# Patient Record
Sex: FEMALE | Race: WHITE | NOT HISPANIC OR LATINO | Employment: OTHER | URBAN - METROPOLITAN AREA
[De-identification: names, ages, dates, MRNs, and addresses within clinical notes are randomized per-mention and may not be internally consistent; named-entity substitution may affect disease eponyms.]

---

## 2017-03-27 ENCOUNTER — APPOINTMENT (OUTPATIENT)
Dept: LAB | Facility: HOSPITAL | Age: 57
End: 2017-03-27
Payer: MEDICARE

## 2017-03-27 DIAGNOSIS — E11.9 TYPE 2 DIABETES MELLITUS WITHOUT COMPLICATIONS (HCC): ICD-10-CM

## 2017-03-27 DIAGNOSIS — I10 ESSENTIAL (PRIMARY) HYPERTENSION: ICD-10-CM

## 2017-03-27 LAB
ALBUMIN SERPL BCP-MCNC: 4.3 G/DL (ref 3.5–5)
ALP SERPL-CCNC: 70 U/L (ref 46–116)
ALT SERPL W P-5'-P-CCNC: 38 U/L (ref 12–78)
ANION GAP SERPL CALCULATED.3IONS-SCNC: 10 MMOL/L (ref 4–13)
AST SERPL W P-5'-P-CCNC: 23 U/L (ref 5–45)
BASOPHILS # BLD AUTO: 0.05 THOUSANDS/ΜL (ref 0–0.1)
BASOPHILS NFR BLD AUTO: 1 % (ref 0–1)
BILIRUB SERPL-MCNC: 0.54 MG/DL (ref 0.2–1)
BUN SERPL-MCNC: 15 MG/DL (ref 5–25)
CALCIUM SERPL-MCNC: 9.3 MG/DL (ref 8.3–10.1)
CHLORIDE SERPL-SCNC: 104 MMOL/L (ref 100–108)
CHOLEST SERPL-MCNC: 272 MG/DL (ref 50–200)
CO2 SERPL-SCNC: 27 MMOL/L (ref 21–32)
CREAT SERPL-MCNC: 0.91 MG/DL (ref 0.6–1.3)
EOSINOPHIL # BLD AUTO: 0.37 THOUSAND/ΜL (ref 0–0.61)
EOSINOPHIL NFR BLD AUTO: 5 % (ref 0–6)
ERYTHROCYTE [DISTWIDTH] IN BLOOD BY AUTOMATED COUNT: 13.5 % (ref 11.6–15.1)
EST. AVERAGE GLUCOSE BLD GHB EST-MCNC: 137 MG/DL
GFR SERPL CREATININE-BSD FRML MDRD: >60 ML/MIN/1.73SQ M
GLUCOSE P FAST SERPL-MCNC: 154 MG/DL (ref 65–99)
HBA1C MFR BLD: 6.4 % (ref 4.2–6.3)
HCT VFR BLD AUTO: 48 % (ref 34.8–46.1)
HDLC SERPL-MCNC: 48 MG/DL (ref 40–60)
HGB BLD-MCNC: 16 G/DL (ref 11.5–15.4)
LDLC SERPL CALC-MCNC: 191 MG/DL (ref 0–100)
LYMPHOCYTES # BLD AUTO: 2.56 THOUSANDS/ΜL (ref 0.6–4.47)
LYMPHOCYTES NFR BLD AUTO: 31 % (ref 14–44)
MCH RBC QN AUTO: 31.5 PG (ref 26.8–34.3)
MCHC RBC AUTO-ENTMCNC: 33.3 G/DL (ref 31.4–37.4)
MCV RBC AUTO: 95 FL (ref 82–98)
MONOCYTES # BLD AUTO: 0.77 THOUSAND/ΜL (ref 0.17–1.22)
MONOCYTES NFR BLD AUTO: 9 % (ref 4–12)
NEUTROPHILS # BLD AUTO: 4.55 THOUSANDS/ΜL (ref 1.85–7.62)
NEUTS SEG NFR BLD AUTO: 54 % (ref 43–75)
NRBC BLD AUTO-RTO: 0 /100 WBCS
PLATELET # BLD AUTO: 261 THOUSANDS/UL (ref 149–390)
PMV BLD AUTO: 12.3 FL (ref 8.9–12.7)
POTASSIUM SERPL-SCNC: 4.6 MMOL/L (ref 3.5–5.3)
PROT SERPL-MCNC: 8.2 G/DL (ref 6.4–8.2)
RBC # BLD AUTO: 5.08 MILLION/UL (ref 3.81–5.12)
SODIUM SERPL-SCNC: 141 MMOL/L (ref 136–145)
T4 FREE SERPL-MCNC: 1.83 NG/DL (ref 0.76–1.46)
TRIGL SERPL-MCNC: 164 MG/DL
TSH SERPL DL<=0.05 MIU/L-ACNC: 4.08 UIU/ML (ref 0.36–3.74)
WBC # BLD AUTO: 8.3 THOUSAND/UL (ref 4.31–10.16)

## 2017-03-27 PROCEDURE — 36415 COLL VENOUS BLD VENIPUNCTURE: CPT

## 2017-03-27 PROCEDURE — 80053 COMPREHEN METABOLIC PANEL: CPT

## 2017-03-27 PROCEDURE — 83036 HEMOGLOBIN GLYCOSYLATED A1C: CPT

## 2017-03-27 PROCEDURE — 85025 COMPLETE CBC W/AUTO DIFF WBC: CPT

## 2017-03-27 PROCEDURE — 80061 LIPID PANEL: CPT

## 2017-03-27 PROCEDURE — 84439 ASSAY OF FREE THYROXINE: CPT

## 2017-03-27 PROCEDURE — 84443 ASSAY THYROID STIM HORMONE: CPT

## 2017-03-30 ENCOUNTER — ALLSCRIPTS OFFICE VISIT (OUTPATIENT)
Dept: OTHER | Facility: OTHER | Age: 57
End: 2017-03-30

## 2017-05-02 ENCOUNTER — ALLSCRIPTS OFFICE VISIT (OUTPATIENT)
Dept: OTHER | Facility: OTHER | Age: 57
End: 2017-05-02

## 2017-05-02 DIAGNOSIS — K58.0 IRRITABLE BOWEL SYNDROME WITH DIARRHEA: ICD-10-CM

## 2017-05-02 DIAGNOSIS — R19.7 DIARRHEA: ICD-10-CM

## 2017-05-02 DIAGNOSIS — Z12.39 ENCOUNTER FOR OTHER SCREENING FOR MALIGNANT NEOPLASM OF BREAST: ICD-10-CM

## 2017-05-16 ENCOUNTER — ALLSCRIPTS OFFICE VISIT (OUTPATIENT)
Dept: OTHER | Facility: OTHER | Age: 57
End: 2017-05-16

## 2017-05-18 ENCOUNTER — LAB REQUISITION (OUTPATIENT)
Dept: LAB | Facility: HOSPITAL | Age: 57
End: 2017-05-18
Payer: MEDICARE

## 2017-05-18 DIAGNOSIS — R19.7 DIARRHEA: ICD-10-CM

## 2017-05-18 PROCEDURE — 87046 STOOL CULTR AEROBIC BACT EA: CPT | Performed by: FAMILY MEDICINE

## 2017-05-18 PROCEDURE — 87015 SPECIMEN INFECT AGNT CONCNTJ: CPT | Performed by: FAMILY MEDICINE

## 2017-05-18 PROCEDURE — 87899 AGENT NOS ASSAY W/OPTIC: CPT | Performed by: FAMILY MEDICINE

## 2017-05-18 PROCEDURE — 87045 FECES CULTURE AEROBIC BACT: CPT | Performed by: FAMILY MEDICINE

## 2017-05-20 LAB
BACTERIA STL CULT: NORMAL
BACTERIA STL CULT: NORMAL

## 2017-06-01 ENCOUNTER — ALLSCRIPTS OFFICE VISIT (OUTPATIENT)
Dept: OTHER | Facility: OTHER | Age: 57
End: 2017-06-01

## 2017-06-07 ENCOUNTER — GENERIC CONVERSION - ENCOUNTER (OUTPATIENT)
Dept: OTHER | Facility: OTHER | Age: 57
End: 2017-06-07

## 2017-07-29 ENCOUNTER — GENERIC CONVERSION - ENCOUNTER (OUTPATIENT)
Dept: OTHER | Facility: OTHER | Age: 57
End: 2017-07-29

## 2017-08-03 ENCOUNTER — LAB REQUISITION (OUTPATIENT)
Dept: LAB | Facility: HOSPITAL | Age: 57
End: 2017-08-03
Payer: MEDICARE

## 2017-08-03 DIAGNOSIS — E03.9 HYPOTHYROIDISM: ICD-10-CM

## 2017-08-03 DIAGNOSIS — I10 ESSENTIAL (PRIMARY) HYPERTENSION: ICD-10-CM

## 2017-08-03 DIAGNOSIS — E78.2 MIXED HYPERLIPIDEMIA: ICD-10-CM

## 2017-08-03 DIAGNOSIS — E11.9 TYPE 2 DIABETES MELLITUS WITHOUT COMPLICATIONS (HCC): ICD-10-CM

## 2017-08-03 LAB
ALBUMIN SERPL BCP-MCNC: 4.3 G/DL (ref 3.5–5)
ALP SERPL-CCNC: 84 U/L (ref 46–116)
ALT SERPL W P-5'-P-CCNC: 53 U/L (ref 12–78)
ANION GAP SERPL CALCULATED.3IONS-SCNC: 9 MMOL/L (ref 4–13)
AST SERPL W P-5'-P-CCNC: 31 U/L (ref 5–45)
BASOPHILS # BLD AUTO: 0.05 THOUSANDS/ΜL (ref 0–0.1)
BASOPHILS NFR BLD AUTO: 1 % (ref 0–1)
BILIRUB SERPL-MCNC: 0.76 MG/DL (ref 0.2–1)
BUN SERPL-MCNC: 12 MG/DL (ref 5–25)
CALCIUM SERPL-MCNC: 9.7 MG/DL (ref 8.3–10.1)
CHLORIDE SERPL-SCNC: 101 MMOL/L (ref 100–108)
CHOLEST SERPL-MCNC: 253 MG/DL (ref 50–200)
CO2 SERPL-SCNC: 27 MMOL/L (ref 21–32)
CREAT SERPL-MCNC: 0.79 MG/DL (ref 0.6–1.3)
EOSINOPHIL # BLD AUTO: 0.55 THOUSAND/ΜL (ref 0–0.61)
EOSINOPHIL NFR BLD AUTO: 7 % (ref 0–6)
ERYTHROCYTE [DISTWIDTH] IN BLOOD BY AUTOMATED COUNT: 15.1 % (ref 11.6–15.1)
EST. AVERAGE GLUCOSE BLD GHB EST-MCNC: 126 MG/DL
GFR SERPL CREATININE-BSD FRML MDRD: 84 ML/MIN/1.73SQ M
GLUCOSE P FAST SERPL-MCNC: 122 MG/DL (ref 65–99)
HBA1C MFR BLD: 6 % (ref 4.2–6.3)
HCT VFR BLD AUTO: 43.1 % (ref 34.8–46.1)
HDLC SERPL-MCNC: 52 MG/DL (ref 40–60)
HGB BLD-MCNC: 14.1 G/DL (ref 11.5–15.4)
LDLC SERPL CALC-MCNC: 132 MG/DL (ref 0–100)
LYMPHOCYTES # BLD AUTO: 2.29 THOUSANDS/ΜL (ref 0.6–4.47)
LYMPHOCYTES NFR BLD AUTO: 28 % (ref 14–44)
MCH RBC QN AUTO: 31 PG (ref 26.8–34.3)
MCHC RBC AUTO-ENTMCNC: 32.7 G/DL (ref 31.4–37.4)
MCV RBC AUTO: 95 FL (ref 82–98)
MONOCYTES # BLD AUTO: 0.85 THOUSAND/ΜL (ref 0.17–1.22)
MONOCYTES NFR BLD AUTO: 11 % (ref 4–12)
NEUTROPHILS # BLD AUTO: 4.35 THOUSANDS/ΜL (ref 1.85–7.62)
NEUTS SEG NFR BLD AUTO: 53 % (ref 43–75)
NRBC BLD AUTO-RTO: 0 /100 WBCS
PLATELET # BLD AUTO: 239 THOUSANDS/UL (ref 149–390)
PMV BLD AUTO: 12.5 FL (ref 8.9–12.7)
POTASSIUM SERPL-SCNC: 5 MMOL/L (ref 3.5–5.3)
PROT SERPL-MCNC: 7.5 G/DL (ref 6.4–8.2)
RBC # BLD AUTO: 4.55 MILLION/UL (ref 3.81–5.12)
SODIUM SERPL-SCNC: 137 MMOL/L (ref 136–145)
T4 FREE SERPL-MCNC: 0.99 NG/DL (ref 0.76–1.46)
TRIGL SERPL-MCNC: 345 MG/DL
TSH SERPL DL<=0.05 MIU/L-ACNC: 6.77 UIU/ML (ref 0.36–3.74)
WBC # BLD AUTO: 8.12 THOUSAND/UL (ref 4.31–10.16)

## 2017-08-03 PROCEDURE — 80061 LIPID PANEL: CPT | Performed by: FAMILY MEDICINE

## 2017-08-03 PROCEDURE — 83036 HEMOGLOBIN GLYCOSYLATED A1C: CPT | Performed by: FAMILY MEDICINE

## 2017-08-03 PROCEDURE — 84443 ASSAY THYROID STIM HORMONE: CPT | Performed by: FAMILY MEDICINE

## 2017-08-03 PROCEDURE — 84439 ASSAY OF FREE THYROXINE: CPT | Performed by: FAMILY MEDICINE

## 2017-08-03 PROCEDURE — 85025 COMPLETE CBC W/AUTO DIFF WBC: CPT | Performed by: FAMILY MEDICINE

## 2017-08-03 PROCEDURE — 80053 COMPREHEN METABOLIC PANEL: CPT | Performed by: FAMILY MEDICINE

## 2017-08-10 ENCOUNTER — GENERIC CONVERSION - ENCOUNTER (OUTPATIENT)
Dept: OTHER | Facility: OTHER | Age: 57
End: 2017-08-10

## 2017-08-10 ENCOUNTER — ALLSCRIPTS OFFICE VISIT (OUTPATIENT)
Dept: OTHER | Facility: OTHER | Age: 57
End: 2017-08-10

## 2017-08-21 ENCOUNTER — HOSPITAL ENCOUNTER (OUTPATIENT)
Dept: MAMMOGRAPHY | Facility: MEDICAL CENTER | Age: 57
Discharge: HOME | End: 2017-08-21
Payer: MEDICARE

## 2017-08-21 DIAGNOSIS — Z12.39 ENCOUNTER FOR OTHER SCREENING FOR MALIGNANT NEOPLASM OF BREAST: ICD-10-CM

## 2017-08-21 PROCEDURE — 77063 BREAST TOMOSYNTHESIS BI: CPT

## 2017-08-21 PROCEDURE — G0202 SCR MAMMO BI INCL CAD: HCPCS

## 2017-08-24 ENCOUNTER — ALLSCRIPTS OFFICE VISIT (OUTPATIENT)
Dept: OTHER | Facility: OTHER | Age: 57
End: 2017-08-24

## 2017-08-24 ENCOUNTER — GENERIC CONVERSION - ENCOUNTER (OUTPATIENT)
Dept: OTHER | Facility: OTHER | Age: 57
End: 2017-08-24

## 2017-10-05 ENCOUNTER — LAB REQUISITION (OUTPATIENT)
Dept: LAB | Facility: HOSPITAL | Age: 57
End: 2017-10-05
Payer: MEDICARE

## 2017-10-05 DIAGNOSIS — R94.6 ABNORMAL RESULTS OF THYROID FUNCTION STUDIES: ICD-10-CM

## 2017-10-05 LAB — TSH SERPL DL<=0.05 MIU/L-ACNC: 2.32 UIU/ML (ref 0.36–3.74)

## 2017-10-05 PROCEDURE — 84443 ASSAY THYROID STIM HORMONE: CPT | Performed by: FAMILY MEDICINE

## 2017-10-16 ENCOUNTER — GENERIC CONVERSION - ENCOUNTER (OUTPATIENT)
Dept: OTHER | Facility: OTHER | Age: 57
End: 2017-10-16

## 2017-10-17 ENCOUNTER — ALLSCRIPTS OFFICE VISIT (OUTPATIENT)
Dept: OTHER | Facility: OTHER | Age: 57
End: 2017-10-17

## 2017-10-31 ENCOUNTER — APPOINTMENT (OUTPATIENT)
Dept: LAB | Facility: HOSPITAL | Age: 57
End: 2017-10-31
Payer: MEDICARE

## 2017-10-31 DIAGNOSIS — E03.9 HYPOTHYROIDISM: ICD-10-CM

## 2017-10-31 DIAGNOSIS — I10 ESSENTIAL (PRIMARY) HYPERTENSION: ICD-10-CM

## 2017-10-31 DIAGNOSIS — E11.9 TYPE 2 DIABETES MELLITUS WITHOUT COMPLICATIONS (HCC): ICD-10-CM

## 2017-10-31 DIAGNOSIS — E78.2 MIXED HYPERLIPIDEMIA: ICD-10-CM

## 2017-10-31 LAB
ALBUMIN SERPL BCP-MCNC: 4.5 G/DL (ref 3.5–5)
ALP SERPL-CCNC: 66 U/L (ref 46–116)
ALT SERPL W P-5'-P-CCNC: 45 U/L (ref 12–78)
ANION GAP SERPL CALCULATED.3IONS-SCNC: 8 MMOL/L (ref 4–13)
AST SERPL W P-5'-P-CCNC: 25 U/L (ref 5–45)
BASOPHILS # BLD AUTO: 0.05 THOUSANDS/ΜL (ref 0–0.1)
BASOPHILS NFR BLD AUTO: 1 % (ref 0–1)
BILIRUB SERPL-MCNC: 0.52 MG/DL (ref 0.2–1)
BUN SERPL-MCNC: 19 MG/DL (ref 5–25)
CALCIUM SERPL-MCNC: 9.7 MG/DL (ref 8.3–10.1)
CHLORIDE SERPL-SCNC: 102 MMOL/L (ref 100–108)
CHOLEST SERPL-MCNC: 254 MG/DL (ref 50–200)
CO2 SERPL-SCNC: 31 MMOL/L (ref 21–32)
CREAT SERPL-MCNC: 0.85 MG/DL (ref 0.6–1.3)
EOSINOPHIL # BLD AUTO: 0.33 THOUSAND/ΜL (ref 0–0.61)
EOSINOPHIL NFR BLD AUTO: 6 % (ref 0–6)
ERYTHROCYTE [DISTWIDTH] IN BLOOD BY AUTOMATED COUNT: 13.3 % (ref 11.6–15.1)
EST. AVERAGE GLUCOSE BLD GHB EST-MCNC: 123 MG/DL
GFR SERPL CREATININE-BSD FRML MDRD: 76 ML/MIN/1.73SQ M
GLUCOSE P FAST SERPL-MCNC: 136 MG/DL (ref 65–99)
HBA1C MFR BLD: 5.9 % (ref 4.2–6.3)
HCT VFR BLD AUTO: 45.2 % (ref 34.8–46.1)
HDLC SERPL-MCNC: 60 MG/DL (ref 40–60)
HGB BLD-MCNC: 14.8 G/DL (ref 11.5–15.4)
LDLC SERPL CALC-MCNC: 154 MG/DL (ref 0–100)
LYMPHOCYTES # BLD AUTO: 1.54 THOUSANDS/ΜL (ref 0.6–4.47)
LYMPHOCYTES NFR BLD AUTO: 26 % (ref 14–44)
MCH RBC QN AUTO: 31 PG (ref 26.8–34.3)
MCHC RBC AUTO-ENTMCNC: 32.7 G/DL (ref 31.4–37.4)
MCV RBC AUTO: 95 FL (ref 82–98)
MONOCYTES # BLD AUTO: 0.57 THOUSAND/ΜL (ref 0.17–1.22)
MONOCYTES NFR BLD AUTO: 10 % (ref 4–12)
NEUTROPHILS # BLD AUTO: 3.53 THOUSANDS/ΜL (ref 1.85–7.62)
NEUTS SEG NFR BLD AUTO: 57 % (ref 43–75)
NRBC BLD AUTO-RTO: 0 /100 WBCS
PLATELET # BLD AUTO: 187 THOUSANDS/UL (ref 149–390)
PMV BLD AUTO: 11.9 FL (ref 8.9–12.7)
POTASSIUM SERPL-SCNC: 4 MMOL/L (ref 3.5–5.3)
PROT SERPL-MCNC: 8.3 G/DL (ref 6.4–8.2)
RBC # BLD AUTO: 4.77 MILLION/UL (ref 3.81–5.12)
SODIUM SERPL-SCNC: 141 MMOL/L (ref 136–145)
T3 SERPL-MCNC: 1.2 NG/ML (ref 0.6–1.8)
T4 FREE SERPL-MCNC: 0.93 NG/DL (ref 0.76–1.46)
TRIGL SERPL-MCNC: 201 MG/DL
TSH SERPL DL<=0.05 MIU/L-ACNC: 4.62 UIU/ML (ref 0.36–3.74)
WBC # BLD AUTO: 6.02 THOUSAND/UL (ref 4.31–10.16)

## 2017-10-31 PROCEDURE — 84439 ASSAY OF FREE THYROXINE: CPT

## 2017-10-31 PROCEDURE — 86376 MICROSOMAL ANTIBODY EACH: CPT

## 2017-10-31 PROCEDURE — 80061 LIPID PANEL: CPT

## 2017-10-31 PROCEDURE — 80053 COMPREHEN METABOLIC PANEL: CPT

## 2017-10-31 PROCEDURE — 36415 COLL VENOUS BLD VENIPUNCTURE: CPT

## 2017-10-31 PROCEDURE — 84443 ASSAY THYROID STIM HORMONE: CPT

## 2017-10-31 PROCEDURE — 84480 ASSAY TRIIODOTHYRONINE (T3): CPT

## 2017-10-31 PROCEDURE — 85025 COMPLETE CBC W/AUTO DIFF WBC: CPT

## 2017-10-31 PROCEDURE — 83036 HEMOGLOBIN GLYCOSYLATED A1C: CPT

## 2017-10-31 PROCEDURE — 86800 THYROGLOBULIN ANTIBODY: CPT

## 2017-11-01 LAB
THYROGLOB AB SERPL-ACNC: 20.7 IU/ML (ref 0–0.9)
THYROPEROXIDASE AB SERPL-ACNC: 190 IU/ML (ref 0–34)

## 2017-11-01 NOTE — PROGRESS NOTES
Assessment  1  Acquired hypothyroidism (244 9) (E03 9)   2  Benign essential hypertension (401 1) (I10)   3  Hypertriglyceridemia (272 1) (E78 1)   4  Type 2 diabetes mellitus (250 00) (E11 9)   5  Anxiety (300 00) (F41 9)    Plan  Acquired hypothyroidism, Benign essential hypertension, Mixed hyperlipidemia, Type 2  diabetes mellitus    · (1) CBC/PLT/DIFF; Status:Active; Requested for:07Pvu0639;    · (1) COMPREHENSIVE METABOLIC PANEL; Status:Active; Requested for:41Nnl9333;    · (1) CORTISOL AM SPECIMEN; Status:Active; Requested for:24Xej6632;    · (1) HEMOGLOBIN A1C; Status:Active; Requested for:26Zxo5675;    · (1) LIPID PANEL FASTING W DIRECT LDL REFLEX; Status:Active; Requested  for:65Zam2269;    · (1) T3 TOTAL; Status:Active; Requested for:04Dec2017;    · (1) T4, FREE; Status:Active; Requested for:96Kly7721;    · (1) THYROID ANTIBODIES PANEL; Status:Active; Requested for:04Dec2017;    · (1) TSH WITH FT4 REFLEX; Status:Active; Requested for:04Dec2017; Anxiety    · Sertraline HCl - 100 MG Oral Tablet; take 1 5  tablet by mouth every day  Arthritis    · Cyclobenzaprine HCl - 10 MG Oral Tablet; TAKE 1 TABLET TWICE DAILY AS  NEEDED  Benign essential hypertension    · Atenolol 50 MG Oral Tablet; TAKE 1 TABLET DAILY  Mixed hyperlipidemia    · Simvastatin 20 MG Oral Tablet; Take 1 tablet daily    Discussion/Summary    Patient is a 62year old female with anxiety/depression which is increased, understandably by the recent death of her   Her thyroid function is normal and she will continue without medication  She feels better with the Sertraline dose  We will check blood work for diabetes, thyroid again in beginning of December  The treatment plan was reviewed with the patient/guardian  The patient/guardian understands and agrees with the treatment plan      Chief Complaint  Pt presents for a follow up to her DMII, depression, anxiety, and Elevated TSH     Patient is here today for follow up of chronic conditions described in HPI  History of Present Illness  Patient is here for folow up of thyroid, diabetes and anxiety  Patient is still with tremors and sweats  Feels that Sertraline that is helping her not go into a full panic attck  She feels she is coping well with her 's death and children are worrying too mch  Review of Systems    Constitutional: No fever, no chills, feels well, no tiredness, no recent weight gain or weight loss  Cardiovascular: No complaints of slow heart rate, no fast heart rate, no chest pain, no palpitations, no leg claudication, no lower extremity edema  Respiratory: No complaints of shortness of breath, no wheezing, no cough, no SOB on exertion, no orthopnea, no PND  Gastrointestinal: No complaints of abdominal pain, no constipation, no nausea or vomiting, no diarrhea, no bloody stools  Psychiatric: anxiety, but-- as noted in HPI  Endocrine: hot flashes, but-- as noted in HPI  Active Problems  1  Acquired hypothyroidism (244 9) (E03 9)   2  Anxiety (300 00) (F41 9)   3  Arthritis (716 90) (M19 90)   4  Benign essential hypertension (401 1) (I10)   5  Depression (311) (F32 9)   6  Diarrhea, unspecified type (787 91) (R19 7)   7  Elevated TSH (794 5) (R94 6)   8  Fibromyalgia (729 1) (M79 7)   9  GERD without esophagitis (530 81) (K21 9)   10  Graves' disease (242 00) (E05 00)   11  Hypertriglyceridemia (272 1) (E78 1)   12  Insomnia (780 52) (G47 00)   13  Irritable bowel syndrome with diarrhea (564 1) (K58 0)   14  Mixed hyperlipidemia (272 2) (E78 2)   15  Nicotine vapor product user (V49 89) (Z78 9)   16  Peripheral neuropathy (356 9) (G62 9)   17  Rheumatoid arthritis (714 0) (M06 9)   18  Type 2 diabetes mellitus (250 00) (E11 9)   19  Vitamin D deficiency (268 9) (E55 9)   20  Yeast dermatitis (112 3) (B37 2)    Past Medical History  1  History of Acute conjunctivitis (372 00) (H10 30)   2   History of Acute pansinusitis, recurrence not specified (461 8) (J01 40)   3  History of Acute recurrent pansinusitis (461 8) (J01 41)   4  History of Acute sinusitis (461 9) (J01 90)   5  History of Anxiety (300 00) (F41 9)   6  History of Arthritis (V13 4)   7  History of Benign colon polyp (211 3) (K63 5)   8  History of Calcific tendinitis (727 82) (M65 20)   9  History of Colon, diverticulosis (562 10) (K57 30)   10  History of Colonoscopy (Fiberoptic) Screening   11  History of Degenerative disc disease, cervical (722 4) (M50 30)   12  History of Depression (311) (F32 9)   13  History of Encounter for gynecological examination (V72 31) (Z01 419)   14  History of Encounter for other screening for malignant neoplasm of breast (V76 19)    (Z12 39)   15  History of Encounter for screening mammogram for malignant neoplasm of breast    (V76 12) (Z12 31)   16  History of Encounter for screening mammogram for malignant neoplasm of breast    (V76 12) (Z12 31)   17  History of Foot deformity (736 70) (M21 969)   18  History of acute gastritis (V12 70) (Z87 19)   19  History of allergy (V15 09) (Z88 9)   20  History of colonic polyps (V12 72) (Z86 010)   21  History of gastroesophageal reflux (GERD) (V12 79) (Z87 19)   22  History of Graves' disease (V12 29) (Z86 39)   23  History of heartburn (V12 79) (Z87 898)   24  History of hemorrhoids (V13 89) (Z87 19)   25  History of hyperthyroidism (V12 29) (Z86 39)   26  History of nausea (V12 79) (Z87 898)   27  History of Multiple joint pain (719 49) (M25 50)   28  History of Neck muscle spasm (728 85) (M62 838)   29  History of Need for 23-polyvalent pneumococcal polysaccharide vaccine (V03 82) (Z23)   30  History of Need for immunization against influenza (V04 81) (Z23)   31  History of Open Wound Of The Hand (882 0)   32  History of Other chronic pain (338 29) (G89 29)   33  History of Other chronic pain (338 29) (G89 29)   34  History of Pelvic Fracture (808 8)   35   History of Routine Gynecological Exam With Cervical Pap Smear (V72 31)   36  History of Shoulder pain, left (719 41) (M25 512)   37  History of Skin lesion (709 9) (L98 9)    The active problems and past medical history were reviewed and updated today  Surgical History  1  History of Colonoscopy (Fiberoptic)    Family History  Mother    1  Family history of    2  Family history of diabetes mellitus (V18 0) (Z83 3)  Family History    3  Family history of Coronary Artery Disease (V17 49)   4  Family history of Dementia   5  Family history of Diabetes Mellitus (V18 0)    Social History   · Denied: History of Being A Social Drinker   · Former smoker (V15 82) (R33 026)   · Nicotine vapor product user (V49 89) (Z78 9)  The social history was reviewed and updated today  The social history was reviewed and is unchanged  Current Meds   1  ALPRAZolam 1 MG Oral Tablet; TAKE ONE (1) TABLET(S) EVERY 8 HOURS ASNEEDED; Therapy: 79WOY9136 to (Evaluate:2017)  Requested for: 41CKH8925; Last   Rx:39Qls7448 Ordered   2  Atenolol 50 MG Oral Tablet; TAKE 1 TABLET DAILY; Therapy: 21NOI3698 to (Evaluate:2017)  Requested for: 26ECD6094; Last   Rx:2017 Ordered   3  Isaiah Contour Next EZ w/Device Kit; Patient tests once a day; Therapy: 36HOS1475 to (Last Valerie Erazo)  Requested for: 87YGO5279 Ordered   4  Isaiah Contour Next Test In [employer]; TEST ONCE DAILY; Therapy: 86PWJ3835 to (Last Rx:2016)  Requested for: 27Fmv3921 Ordered   5  Cyclobenzaprine HCl - 10 MG Oral Tablet; TAKE 1 TABLET TWICE DAILY AS NEEDED; Therapy: 28Jah4016 to (Evaluate:2017)  Requested for: 12RIA3414; Last   Rx:2017 Ordered   6  Losartan Potassium 50 MG Oral Tablet; TAKE ONE TABLET BY MOUTH EVERY DAY; Therapy: 57TVD6722 to (Evaluate:2017)  Requested for: 81ATS8912; Last   Rx:2017 Ordered   7  Multivitamins Oral Capsule; TAKE 1 CAPSULE DAILY; Therapy: 20RXO0761 to Recorded   8   RABEprazole Sodium 20 MG Oral Tablet Delayed Release; take one tablet by mouth every day; Therapy: 95LKK0521 to (Evaluate:08Eee9716)  Requested for: 75EMY2113; Last   Rx:20Jun2017 Ordered   9  Sertraline HCl - 100 MG Oral Tablet; take 1 tablet by mouth every day; Therapy: 09VYR6904 to (Chantal Stage)  Requested for: 39IWF2506; Last   Rx:01Jun2017 Ordered   10  Simvastatin 20 MG Oral Tablet; Take 1 tablet daily; Therapy: 30XXS0592 to (Last Rx:30Mar2017)  Requested for: 58ZMA3899 Ordered   11  Vitamin D3 400 UNIT Oral Tablet; take 2 tablet daily; Therapy: 90WRZ0367 to Recorded    Allergies  1  Aspirin TABS   2  NSAIDs  3  Bee sting   4  Eggs   5  Food   6  Latex   7  Soy    Vitals  Vital Signs    Recorded: 44LNA1595 11:30AM   Temperature 99 6 F, Tympanic   Heart Rate 86   Respiration Quality Normal   Respiration 16   Systolic 808, LUE, Sitting   Diastolic 82, LUE, Sitting   Height 5 ft 5 in   Weight 185 lb 7 oz   BMI Calculated 30 86   BSA Calculated 1 92   O2 Saturation 99, RA   Pain Scale 0     Physical Exam    Constitutional   General appearance: No acute distress, well appearing and well nourished  Pulmonary   Respiratory effort: No increased work of breathing or signs of respiratory distress  Auscultation of lungs: Clear to auscultation  Cardiovascular   Auscultation of heart: Normal rate and rhythm, normal S1 and S2, without murmurs  Examination of extremities for edema and/or varicosities: Normal     Lymphatic   Palpation of lymph nodes in neck: No lymphadenopathy  Musculoskeletal   Gait and station: Normal     Psychiatric   Orientation to person, place, and time: Normal     Mood and affect: Normal          Health Management  History of colonic polyps   COLONOSCOPY; every 3 years; Last 10IFM4919; Next Due: 25VKU2642; Active  History of Encounter for screening mammogram for malignant neoplasm of breast   DIGTL SCRN MAMMO W/CAD; every 1 year; Last 84ITK8310; Next Due: 07Git1959;  Overdue    Future Appointments    Date/Time Provider Specialty Site   11/02/2017 02:30 PM Brice Gomez   12/19/2017 01:30 PM Gemma De Los Santos DO Family Medicine [facility] [address] [place] [address]     Signatures   Electronically signed by : Maylin Arellano DO; Oct 31 2017  9:20AM EST                       (Author)

## 2017-11-02 ENCOUNTER — GENERIC CONVERSION - ENCOUNTER (OUTPATIENT)
Dept: OTHER | Facility: OTHER | Age: 57
End: 2017-11-02

## 2017-12-04 DIAGNOSIS — E78.2 MIXED HYPERLIPIDEMIA: ICD-10-CM

## 2017-12-04 DIAGNOSIS — I10 ESSENTIAL (PRIMARY) HYPERTENSION: ICD-10-CM

## 2017-12-04 DIAGNOSIS — E11.9 TYPE 2 DIABETES MELLITUS WITHOUT COMPLICATIONS (HCC): ICD-10-CM

## 2017-12-04 DIAGNOSIS — E03.9 HYPOTHYROIDISM: ICD-10-CM

## 2017-12-05 ENCOUNTER — LAB REQUISITION (OUTPATIENT)
Dept: LAB | Facility: HOSPITAL | Age: 57
End: 2017-12-05
Payer: MEDICARE

## 2017-12-05 DIAGNOSIS — E03.9 HYPOTHYROIDISM: ICD-10-CM

## 2017-12-05 LAB
CORTIS AM PEAK SERPL-MCNC: 29.2 UG/DL (ref 4.2–22.4)
T4 FREE SERPL-MCNC: 1.04 NG/DL (ref 0.76–1.46)
TSH SERPL DL<=0.05 MIU/L-ACNC: 5 UIU/ML (ref 0.36–3.74)

## 2017-12-05 PROCEDURE — 84439 ASSAY OF FREE THYROXINE: CPT | Performed by: FAMILY MEDICINE

## 2017-12-05 PROCEDURE — 84443 ASSAY THYROID STIM HORMONE: CPT | Performed by: FAMILY MEDICINE

## 2017-12-05 PROCEDURE — 82533 TOTAL CORTISOL: CPT | Performed by: FAMILY MEDICINE

## 2017-12-19 ENCOUNTER — GENERIC CONVERSION - ENCOUNTER (OUTPATIENT)
Dept: OTHER | Facility: OTHER | Age: 57
End: 2017-12-19

## 2017-12-22 ENCOUNTER — GENERIC CONVERSION - ENCOUNTER (OUTPATIENT)
Dept: OTHER | Facility: OTHER | Age: 57
End: 2017-12-22

## 2018-01-11 NOTE — PROGRESS NOTES
Plan    1  DSMT/MNT Time Record; Status:Complete;   Done: 29HDQ2967 12:00AM    Discussion/Summary    PATIENT EDUCATION RECORD   Indication for Services: type 2 Diabetes Mellitus  She is ready to learn  She has no barriers to learning  Healthy Eating:   Discussed general nutrition topics: Method: Instruction  Response: Verbalizes Understanding   Discussed importance of meal timing/consistency: Method: Instruction  Response: Verbalizes Understanding   Discussed nutrient types ( Cho/Fat/Protein): Method: Instruction  Response: Verbalizes Understanding   Discussed portion sizes: Method: Instruction  Response: Verbalizes Understanding   Discussed sodium intake: Method: Instruction  Response: Verbalizes Understanding   Discussed food label reading: Method: Instruction  Response: Verbalizes Understanding  Provided food diary and instructions on use: Method: Instruction and HandoutResponse: Verbalizes Understanding   Provided meal planning: Method: Instruction  Response: Verbalizes Understanding Her current weight is 203  Her keal needs are 8361-8244 calories  Her CHO's per meal are 45 grams  He/She was provided a meal plan for: fixed carbohydrates and weight loss  Discussed weight management/weight loss: Method: Instruction  Response: Verbalizes Understanding Her weight goal is Lose 10% of present body weight  Discussed fat intake and choices: Method: Instruction  Response: Verbalizes Understanding   Discussed basic carbohydrate counting: Method: Instruction  Response: Verbalizes Understanding   Being Active:   Stated the benefits of exercise: Method: Instruction  Response: Verbalizes Understanding   Discussed "exercise guidelines": Method: Instruction  Response: Verbalizes Understanding      Chief Complaint  Patient with T2DM seen for diet consult per MD request       History of Present Illness  Patient reports a weight loss of 11 pounds in the past month secondary to diarrhea caused by medication (Metformin)   Patient reports having a sensitive stomach that is easily upset  She reports poor sleep habits  Problems identified in food recall include inconsistent carbohydrate intake at meals, meal skipping, what appears to be sub-optimal caloric intake, low intake of plant based foods, whole grains and low fat dairy and general lack of balance  Advised patient to follow the 1400 calorie meal plan she was given at a previous visit (patient brought the meal plan and portion booklet she was given at her visit with the RD in 2013)  Encouraged her to keep her carbohydrate intake to ~45 grams per meal and 15 grams per HS snack to assist with glycemic control  Suggested she limit lean protein to 6 ounces a day and limit added fat to 4 servings daily to assist with lipid management and calorie control  Patient would benefit from consuming 3 meals a day 4-5 hours apart  Patient was participating in [facility] for 1 hour 3 days a week 6 months prior to her medication reaction  She was encouraged to resume aerobic exercise and work her way up to 150 minutes a week  Patient agreed to keep daily food logs  Follow-up TBD  RD will remain available for further dietary questions/concerns  This is her initial assessment    Present at session: patient    Medical Diagnosis/Reason for Referral:T2DM  She has no special learning needs  Her caloric needs are 8652-2443 calories  Recent weight change: 11 pound weight loss in 1 month  Patient  shops for food  Patient  cooks the food  Exercise routine:  Patient does not exercise beyond her daily activities     She eats breakfast at  11:00AM AM SKIPPED daily or, occasionally, would have 1 Boost; as of 1 week ago is having 1/4 cup Cheerios, 1/4 cup skim plus milk, occasionally 1 medium banana    She eats lunch at  2:00PM PM SKIPPED daily; as of 1 week ago is having tomato, onion, pepper and 1/4 tsp mayonnaise on 2 slices low calorie bread (2 slices = 10 grams CHO) OR salad with imitation crab meat, red beets, and 2 Tblsp oil & vinegar dressing    She eats dinner at  7:00PM PM Was having 6oz chicken, 1 cup rice and beans, non-starchy veggies or a salad OR 1x a week take out: chicken cheese steak hoagie ; now is having 3oz chicken or fish, small sweet potato with 1 tsp butter, salad or non-starchy veggies      NUTRITION DIAGNOSES   Overweight Obesity   Overweight obesity related to: Food and nutrition related knowledge deficit  As evidenced by: BMI more than normative standard for age and sex (obesity-grade I 30-34 9  Medical Nutrition Therapy Intervention: Plate Method, Carbohydrate counting, Meal planning, Individualized meal plan, Strategies to increase the intake of plant based foods, Strategies to monitor portion control, Label reading, Meal timing, Exercise Guidelines, Behavior modification strategies and Weight/BMI Goals  Her comprehension was good   Her motivation was good   Her compliance was good   Goals:  1  45 grams CHO per meal   2  6 ounces lean protein daily and 4 added fats a day  3  Resume aerobic exercise and work up to 150 minutes of aerobic exercise weekly        Vitals  Signs   Recorded: 17Ggn3574 11:22AM   Weight: 203 lb   BMI Calculated: 33 27  BSA Calculated: 2    Results/Data  DSMT/MNT Time Record 97Qyl4775 12:00AM Anup Padilla     Test Name Result Flag Reference   Date of Service 7/26/2016     Start - Stop Time 9:15-10:15AM     Total MInutes 60 minutes     Group Or Individual Instruction MNT-I       Future Appointments    Date/Time Provider Specialty Site   07/27/2016 01:15 PM Brice Elliott   11/29/2016 11:30 AM Mai Arthur DO Family Medicine [facility] [place]   08/29/2016 09:45 AM Marry Jordan MD General Surgery [facility]     Signatures   Electronically signed by : Nabila Ontiveros, [facility]; Jul 26 2016  2:48PM EST                       (Author)    Electronically signed by : MARGARITA Moore ; Jul 27 2016  8:25AM EST

## 2018-01-12 NOTE — MISCELLANEOUS
Message   Recorded as Task   Date: 09/12/2016 12:35 PM, Created By: Rosa Zambrano   Task Name: Go to Result   [facility]   Regarding Patient: Shakila Leblanc, Status: Active   CommentArmida Covert - 12 Sep 2016 12:35 PM     TASK CREATED  Tubular adenomas, benign, 3-5 year follow-up  [facility] - 13 Sep 2016 1:09 PM     TASK EDITED  Called patient with pathology results and informed her that the polyps that were removed were tubular adenomas which are benign and negative for cancer  She will need to have a follow-up colonoscopy in 3-5 years to monitor for any new polyps  Those kinds of polyps could change over time into cancer so that is why it is important to do repeat screenings  A reminder letter will be sent in the mail shortly but she will need to call and schedule an appointment in 3-5 years  Patient verbalized understanding and had no questions or concerns  Active Problems    1  Acquired hypothyroidism (244 9) (E03 9)   2  Acute recurrent pansinusitis (461 8) (J01 41)   3  Anxiety (300 00) (F41 9)   4  Arthritis (716 90) (M19 90)   5  Benign essential hypertension (401 1) (I10)   6  Colonoscopy (Fiberoptic) Screening   7  Depression (311) (F32 9)   8  Diarrhea, unspecified type (787 91) (R19 7)   9  Fibromyalgia (729 1) (M79 7)   10  Graves' disease (242 00) (E05 00)   11  Hypertriglyceridemia (272 1) (E78 1)   12  Insomnia (780 52) (G47 00)   13  Peripheral neuropathy (356 9) (G62 9)   14  Rheumatoid arthritis (714 0) (M06 9)   15  Skin lesion (709 9) (L98 9)   16  Type 2 diabetes mellitus (250 00) (E11 9)   17  Vitamin D deficiency (268 9) (E55 9)    Current Meds   1  ALPRAZolam 1 MG Oral Tablet (Xanax); TAKE ONE (1) TABLET(S) EVERY 8 HOURS   ASNEEDED; Therapy: 86MAS6102 to (Evaluate:24Kra8006)  Requested for: 97Ryc3072; Last   Rx:11Kdl4327 Ordered   2   Atenolol 50 MG Oral Tablet; TAKE 1 TABLET DAILY  Requested for: 94ZRF5307; Last   BF:15DPF1915 Ordered   3  Isaiah Contour Next Test In [employer]; TEST ONCE DAILY; Therapy: 90DVO6316 to (Last Rx:53Fui6485)  Requested for: 58Zml0411 Ordered   4  Cyclobenzaprine HCl - 10 MG Oral Tablet; TAKE 1 TABLET TWICE DAILY AS NEEDED; Therapy: 60Auh0956 to (Evaluate:35Yts5360)  Requested for: 34XQG0546; Last   FR:72IGF1680 Ordered   5  Losartan Potassium 50 MG Oral Tablet; Take 1 tablet daily; Therapy: 09RCG9975 to (Evaluate:31Oct2016)  Requested for: 67WDZ4367; Last   TX:93VIO9859 Ordered   6  MetFORMIN HCl - 500 MG Oral Tablet; take 2 tablet daily; Therapy: 23DXN3015 to Recorded   7  Multivitamins Oral Capsule; TAKE 1 CAPSULE DAILY; Therapy: 81MTO8015 to Recorded   8  RABEprazole Sodium 20 MG Oral Tablet Delayed Release (Aciphex); take one tablet by   mouth every day; Therapy: 02HRC2867 to (Evaluate:11Nov2016)  Requested for: 24Yoj9754; Last   Rx:39Gel3987 Ordered   9  Vitamin D3 400 UNIT Oral Tablet; take 2 tablet daily; Therapy: 68LNL2089 to Recorded   10  Zolpidem Tartrate 10 MG Oral Tablet; Take 1 tablet by mouth at bedtime; Therapy: 08Rkm3208 to (Evaluate:47Kvv9376); Last Rx:65Mln9956 Ordered    Allergies    1  Aspirin TABS   2  NSAIDs    3  Bee sting   4  Eggs   5  Food   6  Latex   7   Soy    Signatures   Electronically signed by : Gudelia Nguyen, ; Sep 13 2016  1:09PM EST                       (Author)

## 2018-01-12 NOTE — RESULT NOTES
Verified Results  MAMMO SCREENING BILATERAL W 3D & CAD 68Pgu2976 03:19PM Nacho Monsalve Order Number: [id number]    - Patient Instructions: To schedule this appointment, please contact Central Scheduling at [phone]  Do not wear any perfume, powder, lotion or deodorant on breast or underarm area  Please bring your doctors order, referral (if needed) and insurance information with you on the day of the test  Failure to bring this information may result in this test being rescheduled  Arrive 15 minutes prior to your appointment time to register  On the day of your test, please bring any prior mammogram or breast studies with you that were not performed at a [facility]  Failure to bring prior exams may result in your test needing to be rescheduled  Test Name Result Flag Reference   MAMMO SCREENING BILATERAL W 3D & CAD (Report)     Patient History:   Patient is postmenopausal    Family history of breast cancer at age 48 or over in maternal    cousin  No Hormone Replacement Therapy   Patient is a former smoker  Patient's BMI is 31 9      Reason for exam: screening, asymptomatic  Mammo Screening Bilateral W DBT and CAD: August 21, 2017 - Check    In #: [de-identified]   2D/3D Procedure   3D views: Bilateral MLO view(s) were taken  2D views: Bilateral CC view(s) were taken  Technologist: RT Huey(R)(M)   Prior study comparison: April 27, 2016, mammo screening bilateral   W DBT and CAD performed at [place] [address]  December 11, 2014, bilateral digital screening mammogram   performed at [place] [address]  September 24, 2013, bilateral digital screening mammogram    performed at [place] [address]      February 23, 2010, digital bilateral screening mammogram,    performed at [facility]  2008, digital bilateral    screening mammogram, performed at [facility]      The breast tissue is almost entirely fat  No new dominant soft    tissue mass, architectural distortion or suspicious    calcifications are noted  The skin and nipple structures are    within normal limits  A small cluster of microcalcification is    seen in the LEFT breast cc view middle 3rd just medial to the    nipple line  On tomographic MLO imaging these appear to likely    reside within the skin  No mammographic evidence of malignancy  No    significant changes when compared with prior studies  ACR BI-RADSï¾® Assessments: BiRad:2 - Benign     Recommendation:   Routine screening mammogram of both breasts in 1 year  The patient is scheduled in a reminder system  8-10% of cancers will be missed on mammography  Management of a    palpable abnormality must be based on clinical grounds  Patients    will be notified of their results via letter from our facility  Accredited by [employer] of Radiology and FDA  Transcription Location:  [address]: [id number]     Risk Value(s):   Tyrer-Cuzick 10 Year: 2 800%, Tyrer-Cuzick Lifetime: 8 600%,    Myriad Table: 1 5%, ЕЛЕНА 5 Year: 0 9%, NCI Lifetime: 5 9%   Signed by:   Monica Ramirez MD   8/22/17       Plan  PMH: Encounter for screening mammogram for malignant neoplasm of breast    · DIGTL SCRN MAMMO W/CAD ; every 1 year; Last 99NVO6454;  Next [id number];  Status:Active

## 2018-01-13 VITALS
HEIGHT: 65 IN | WEIGHT: 185.5 LBS | OXYGEN SATURATION: 98 % | HEART RATE: 85 BPM | BODY MASS INDEX: 30.91 KG/M2 | RESPIRATION RATE: 16 BRPM | SYSTOLIC BLOOD PRESSURE: 112 MMHG | DIASTOLIC BLOOD PRESSURE: 78 MMHG | TEMPERATURE: 97.6 F

## 2018-01-13 VITALS
DIASTOLIC BLOOD PRESSURE: 82 MMHG | WEIGHT: 191 LBS | BODY MASS INDEX: 31.82 KG/M2 | TEMPERATURE: 99.2 F | HEART RATE: 82 BPM | OXYGEN SATURATION: 97 % | SYSTOLIC BLOOD PRESSURE: 122 MMHG | HEIGHT: 65 IN

## 2018-01-13 VITALS
BODY MASS INDEX: 30.87 KG/M2 | HEIGHT: 65 IN | TEMPERATURE: 99.8 F | OXYGEN SATURATION: 97 % | HEART RATE: 85 BPM | WEIGHT: 185.25 LBS | SYSTOLIC BLOOD PRESSURE: 130 MMHG | DIASTOLIC BLOOD PRESSURE: 84 MMHG

## 2018-01-13 NOTE — MISCELLANEOUS
Message   Recorded as Task   Date: 09/07/2016 09:10 AM, Created By: Nay Jeffers   Task Name: Follow Up   Assigned To: [facility],Team   Regarding Patient: Ezra Solis, Status: Active   CommentEddi Martinez - 07 Sep 2016 9:10 AM     TASK CREATED    Routine post colono call placed to patient  Procedure done on 9/6/16  Patient answered and states she is doing good  Informed her that once the results were in I would give her a call  Patient verbalized understanding and had no questions or concerns  Pathology pending  {Diverticulosis and Hemorrhoids, Multiple polyp(s) seen in [place], Right Colon, Transverse Colon and Rectum  3 year follow-up}   Nay Jeffers - 13 Sep 2016 1:13 PM     TASK EDITED  Results called in to patient  See note from 9/13/16 for full discussion of results with patient  Active Problems    1  Acquired hypothyroidism (244 9) (E03 9)   2  Acute recurrent pansinusitis (461 8) (J01 41)   3  Anxiety (300 00) (F41 9)   4  Arthritis (716 90) (M19 90)   5  Benign essential hypertension (401 1) (I10)   6  Colonoscopy (Fiberoptic) Screening   7  Depression (311) (F32 9)   8  Diarrhea, unspecified type (787 91) (R19 7)   9  Fibromyalgia (729 1) (M79 7)   10  Graves' disease (242 00) (E05 00)   11  Hypertriglyceridemia (272 1) (E78 1)   12  Insomnia (780 52) (G47 00)   13  Peripheral neuropathy (356 9) (G62 9)   14  Rheumatoid arthritis (714 0) (M06 9)   15  Skin lesion (709 9) (L98 9)   16  Type 2 diabetes mellitus (250 00) (E11 9)   17  Vitamin D deficiency (268 9) (E55 9)    Current Meds   1  ALPRAZolam 1 MG Oral Tablet (Xanax); TAKE ONE (1) TABLET(S) EVERY 8 HOURS   ASNEEDED; Therapy: 02UXE7782 to (Evaluate:93Mgx8884)  Requested for: 67Ttz0526; Last   Rx:11Een5669 Ordered   2  Atenolol 50 MG Oral Tablet; TAKE 1 TABLET DAILY  Requested for: 18NWW2219; Last   Rx:79Cho1597 Ordered   3  Isaiah Contour Next Test In [employer]; TEST ONCE DAILY;    Therapy: 97KFG8829 to (Last Rx:58Sgx9557)  Requested for: 93Sev0351 Ordered   4  Cyclobenzaprine HCl - 10 MG Oral Tablet; TAKE 1 TABLET TWICE DAILY AS NEEDED; Therapy: 06Hyp1912 to (Evaluate:62Syy5702)  Requested for: 01EWM2026; Last   BF:85OPG4462 Ordered   5  Losartan Potassium 50 MG Oral Tablet; Take 1 tablet daily; Therapy: 83AWR4095 to (Evaluate:31Oct2016)  Requested for: 39EIP9678; Last   UY:30WPP5248 Ordered   6  MetFORMIN HCl - 500 MG Oral Tablet; take 2 tablet daily; Therapy: 63CKW6620 to Recorded   7  Multivitamins Oral Capsule; TAKE 1 CAPSULE DAILY; Therapy: 02SGB4739 to Recorded   8  RABEprazole Sodium 20 MG Oral Tablet Delayed Release (Aciphex); take one tablet by   mouth every day; Therapy: 65AIK8269 to (Evaluate:11Nov2016)  Requested for: 12Nax8800; Last   Rx:55Hio2263 Ordered   9  Vitamin D3 400 UNIT Oral Tablet; take 2 tablet daily; Therapy: 42LDJ8798 to Recorded   10  Zolpidem Tartrate 10 MG Oral Tablet; Take 1 tablet by mouth at bedtime; Therapy: 65Vyv5462 to (Evaluate:13Cgg9148); Last Rx:23Bwj3789 Ordered    Allergies    1  Aspirin TABS   2  NSAIDs    3  Bee sting   4  Eggs   5  Food   6  Latex   7   Soy    Signatures   Electronically signed by : Mandy Brewster, ; Sep 13 2016  1:14PM EST                       (Author)

## 2018-01-13 NOTE — MISCELLANEOUS
Message   Date: 15 Sep 2016 11:40 AM EST, Recorded By: Krysta Bradley For: [facility]   Called patient to clarify that her next colonoscopy will be due in 3 years  A reminder letter will be mailed out shortly which will also say 3 year follow-up  Told her to disregard the 3-5 year follow-up that I had initially told her and to follow-up in 3 years  Patient verbalized understanding and had no questions  She was appreciative of the call  Active Problems    1  Acquired hypothyroidism (244 9) (E03 9)   2  Acute recurrent pansinusitis (461 8) (J01 41)   3  Anxiety (300 00) (F41 9)   4  Arthritis (716 90) (M19 90)   5  Benign essential hypertension (401 1) (I10)   6  Colonoscopy (Fiberoptic) Screening   7  Depression (311) (F32 9)   8  Diarrhea, unspecified type (787 91) (R19 7)   9  Fibromyalgia (729 1) (M79 7)   10  Graves' disease (242 00) (E05 00)   11  Hypertriglyceridemia (272 1) (E78 1)   12  Insomnia (780 52) (G47 00)   13  Peripheral neuropathy (356 9) (G62 9)   14  Rheumatoid arthritis (714 0) (M06 9)   15  Skin lesion (709 9) (L98 9)   16  Type 2 diabetes mellitus (250 00) (E11 9)   17  Vitamin D deficiency (268 9) (E55 9)    Current Meds   1  ALPRAZolam 1 MG Oral Tablet (Xanax); TAKE ONE (1) TABLET(S) EVERY 8 HOURS   ASNEEDED; Therapy: 30EIM1737 to (Evaluate:23Lxe1429)  Requested for: 93Ywr3151; Last   Rx:16Ncr5811 Ordered   2  Atenolol 50 MG Oral Tablet; TAKE 1 TABLET DAILY  Requested for: 03JDH1465; Last   Rx:10Enj9583 Ordered   3  Isaiah Contour Next Test In [employer]; TEST ONCE DAILY; Therapy: 59PEC0803 to (Last Rx:88Usc3525)  Requested for: 99Sol3189 Ordered   4  Cyclobenzaprine HCl - 10 MG Oral Tablet; TAKE 1 TABLET TWICE DAILY AS NEEDED; Therapy: 67Gch1027 to (Evaluate:55Tih0923)  Requested for: 59KGD9450; Last   XV:78MEP8389 Ordered   5  Losartan Potassium 50 MG Oral Tablet; Take 1 tablet daily;    Therapy: 07LFD2616 to (Evaluate:31Oct2016)  Requested for: 39HBT3498; Last   TL:47GAH9295 Ordered   6  MetFORMIN HCl - 500 MG Oral Tablet; take 2 tablet daily; Therapy: 73MDX0989 to Recorded   7  Multivitamins Oral Capsule; TAKE 1 CAPSULE DAILY; Therapy: 52VCQ6331 to Recorded   8  RABEprazole Sodium 20 MG Oral Tablet Delayed Release (Aciphex); take one tablet by   mouth every day; Therapy: 38GPJ2977 to (Evaluate:11Nov2016)  Requested for: 81Iey4891; Last   Rx:64Ptm1315 Ordered   9  Vitamin D3 400 UNIT Oral Tablet; take 2 tablet daily; Therapy: 60ICL5332 to Recorded   10  Zolpidem Tartrate 10 MG Oral Tablet; Take 1 tablet by mouth at bedtime; Therapy: 19Hwg7435 to (Evaluate:42Cab2695); Last Rx:46Esa0331 Ordered    Allergies    1  Aspirin TABS   2  NSAIDs    3  Bee sting   4  Eggs   5  Food   6  Latex   7   Soy    Signatures   Electronically signed by : Nella Lake, ; Sep 15 2016 11:44AM EST                       (Author)

## 2018-01-14 VITALS
RESPIRATION RATE: 16 BRPM | DIASTOLIC BLOOD PRESSURE: 82 MMHG | TEMPERATURE: 99.6 F | OXYGEN SATURATION: 99 % | HEART RATE: 86 BPM | HEIGHT: 65 IN | WEIGHT: 185.44 LBS | BODY MASS INDEX: 30.89 KG/M2 | SYSTOLIC BLOOD PRESSURE: 120 MMHG

## 2018-01-14 VITALS
TEMPERATURE: 97.7 F | SYSTOLIC BLOOD PRESSURE: 138 MMHG | BODY MASS INDEX: 30.89 KG/M2 | HEART RATE: 85 BPM | HEIGHT: 65 IN | DIASTOLIC BLOOD PRESSURE: 90 MMHG | WEIGHT: 185.44 LBS | OXYGEN SATURATION: 98 % | RESPIRATION RATE: 17 BRPM

## 2018-01-14 VITALS
BODY MASS INDEX: 33.46 KG/M2 | WEIGHT: 196 LBS | SYSTOLIC BLOOD PRESSURE: 110 MMHG | DIASTOLIC BLOOD PRESSURE: 88 MMHG | HEIGHT: 64 IN | OXYGEN SATURATION: 97 % | TEMPERATURE: 99.6 F | HEART RATE: 85 BPM

## 2018-01-14 VITALS
HEART RATE: 84 BPM | TEMPERATURE: 99 F | HEIGHT: 65 IN | WEIGHT: 186 LBS | OXYGEN SATURATION: 98 % | DIASTOLIC BLOOD PRESSURE: 76 MMHG | SYSTOLIC BLOOD PRESSURE: 126 MMHG | BODY MASS INDEX: 30.99 KG/M2

## 2018-01-15 NOTE — RESULT NOTES
Verified Results  (1) COMPREHENSIVE METABOLIC PANEL 96JBF0187 93:07AX Spenser Osborne Order Number: [id number]     Test Name Result Flag Reference   GLUCOSE,RANDM 124 mg/dL     If the patient is fasting, the ADA then defines impaired fasting glucose as > 100 mg/dL and diabetes as > or equal to 123 mg/dL  SODIUM 144 mmol/L  136-145   POTASSIUM 4 5 mmol/L  3 5-5 3   CHLORIDE 106 mmol/L  100-108   CARBON DIOXIDE 29 mmol/L  21-32   ANION GAP (CALC) 9 mmol/L  4-13   BLOOD UREA NITROGEN 11 mg/dL  5-25   CREATININE 0 87 mg/dL  0 60-1 30   Standardized to IDMS reference method   CALCIUM 9 3 mg/dL  8 3-10 1   BILI, TOTAL 0 44 mg/dL  0 20-1 00   ALK PHOSPHATAS 58 U/L     ALT (SGPT) 27 U/L  12-78   AST(SGOT) 17 U/L  5-45   ALBUMIN 4 1 g/dL  3 5-5 0   TOTAL PROTEIN 7 8 g/dL  6 4-8 2   eGFR Non-African American      >60 0 ml/min/1 73sq m   - Patient Instructions: This is a fasting blood test  Water, black tea or black coffee only after 9:00pm the night before test Drink 2 glasses of water the morning of test   National Kidney Disease Education Program recommendations are as follows:  GFR calculation is accurate only with a steady state creatinine  Chronic Kidney disease less than 60 ml/min/1 73 sq  meters  Kidney failure less than 15 ml/min/1 73 sq  meters  (1) HEMOGLOBIN A1C 27Oct2016 08:42AM Ilene BROOKS Order Number: [id number]     Test Name Result Flag Reference   HEMOGLOBIN A1C 6 2 %  4 2-6 3   EST  AVG  GLUCOSE 131 mg/dl       (1) LIPID PANEL FASTING W DIRECT LDL REFLEX 27Oct2016 08:42AM Ilene BROOKS Order Number: [id number]     Test Name Result Flag Reference   CHOLESTEROL 223 mg/dL H    LDL CHOLESTEROL CALCULATED 141 mg/dL H 0-100   - Patient Instructions: This is a fasting blood test  Water, black tea or black coffee only after 9:00pm the night before test   Drink 2 glasses of water the morning of test     - Patient Instructions:  This is a fasting blood test  Water, black tea or black coffee only after 9:00pm the night before test Drink 2 glasses of water the morning of test   Triglyceride:         Normal              <150 mg/dl       Borderline High    150-199 mg/dl       High               200-499 mg/dl       Very High          >499 mg/dl  Cholesterol:         Desirable        <200 mg/dl      Borderline High  200-239 mg/dl      High             >239 mg/dl  HDL Cholesterol:        High    >59 mg/dL      Low     <41 mg/dL  LDL Cholesterol:        Optimal          <100 mg/dl        Near Optimal     100-129 mg/dl        Above Optimal          Borderline High   130-159 mg/dl          High              160-189 mg/dl          Very High        >189 mg/dl  LDL CALCULATED:    This screening LDL is a calculated result  It does not have the accuracy of the Direct Measured LDL in the monitoring of patients with hyperlipidemia and/or statin therapy  Direct Measure LDL (AXH754) must be ordered separately in these patients  TRIGLYCERIDES 198 mg/dL H <=150   Specimen collection should occur prior to N-Acetylcysteine or Metamizole administration due to the potential for falsely depressed results  HDL,DIRECT 42 mg/dL  40-60   Specimen collection should occur prior to Metamizole administration due to the potential for falsely depressed results  (1) TSH WITH FT4 REFLEX 27Oct2016 08:42AM Carolina Sarmiento   TW Order Number: [id number]     Test Name Result Flag Reference   TSH 3 919 uIU/mL H 0 358-3 740   A FT4 has been ordered    - Patient Instructions: This is a fasting blood test  Water, black tea or black coffee only after 9:00pm the night before test Drink 2 glasses of water the morning of test   Patients undergoing fluorescein dye angiography may retain small amounts of fluorescein in the body for 48-72 hours post procedure  Samples containing fluorescein can produce falsely depressed TSH values   If the patient had this procedure,a specimen should be resubmitted post fluorescein clearance  The recommended reference ranges for TSH during pregnancy are as follows:  First trimester 0 1 to 2 5 uIU/mL  Second trimester  0 2 to 3 0 uIU/mL  Third trimester 0 3 to 3 0 uIU/m     (1) TSH WITH FT4 REFLEX 27Oct2016 08:42AM Carolina Sarmiento    Order Number: [id number]     Test Name Result Flag Reference   TSH 3 919 uIU/mL H 0 358-3 740   A FT4 has been ordered    - Patient Instructions: This is a fasting blood test  Water, black tea or black coffee only after 9:00pm the night before test Drink 2 glasses of water the morning of test   Patients undergoing fluorescein dye angiography may retain small amounts of fluorescein in the body for 48-72 hours post procedure  Samples containing fluorescein can produce falsely depressed TSH values  If the patient had this procedure,a specimen should be resubmitted post fluorescein clearance            The recommended reference ranges for TSH during pregnancy are as follows:  First trimester 0 1 to 2 5 uIU/mL  Second trimester  0 2 to 3 0 uIU/mL  Third trimester 0 3 to 3 0 uIU/m   T4,FREE 1 04 ng/dL  0 76-1 46

## 2018-01-15 NOTE — RESULT NOTES
Verified Results  (1) TSH WITH FT4 REFLEX 05Oct2017 12:00AM Jessica Bernardo    Order Number: [id number]     Test Name Result Flag Reference   TSH 2 320 uIU/mL  0 358-3 740   Patients undergoing fluorescein dye angiography may retain small amounts of fluorescein in the body for 48-72 hours post procedure  Samples containing fluorescein can produce falsely depressed TSH values  If the patient had this procedure,a specimen should be resubmitted post fluorescein clearance            The recommended reference ranges for TSH during pregnancy are as follows:  First trimester 0 1 to 2 5 uIU/mL  Second trimester  0 2 to 3 0 uIU/mL  Third trimester 0 3 to 3 0 uIU/m

## 2018-01-17 NOTE — MISCELLANEOUS
Assessment    1  Type 2 diabetes mellitus (250 00) (E11 9)   2  Acquired hypothyroidism (244 9) (E03 9)   3  Benign essential hypertension (401 1) (I10)   4  Hypertriglyceridemia (272 1) (E78 1)   5  Insomnia (780 52) (G47 00)    Plan  Acquired hypothyroidism, Benign essential hypertension, Type 2 diabetes mellitus    · (1) COMPREHENSIVE METABOLIC PANEL; Status:Active; Requested for:58Wae3205;    Perform:[facility]; AAX:25KDW4898;NSJNZOP; For:Acquired hypothyroidism, Benign essential hypertension, Type 2 diabetes mellitus; Ordered By:Yany Espinal;   · (1) HEMOGLOBIN A1C; Status:Active; Requested for:14Gid0881;    Perform:[facility]; KIR:41EZJ3995;XFFDBNY; For:Acquired hypothyroidism, Benign essential hypertension, Type 2 diabetes mellitus; Ordered By:Mili Espinal;   · (1) LIPID PANEL FASTING W DIRECT LDL REFLEX; Status:Active; Requested  for:44Jtk8680;    Perform:[facility]; PWC:63OVY6853;FKTFHHB; For:Acquired hypothyroidism, Benign essential hypertension, Type 2 diabetes mellitus; Ordered By:Yany Espinal;   · (1) TSH WITH FT4 REFLEX; Status:Active; Requested for:87Npm0613;    Perform:[facility]; [facility]:87HWK9165;BOCLUVO; For:Acquired hypothyroidism, Benign essential hypertension, Type 2 diabetes mellitus; Ordered By:Mili Espinal; Anxiety    · ALPRAZolam 1 MG Oral Tablet (Xanax); TAKE ONE (1) TABLET(S) EVERY 8  HOURS ASNEEDED   Rx By: Maxine Pizano; Dispense: 20 Days ; #:60 Tablet; Refill: 0; For: Anxiety; MARYA = N; Call Rx  Insomnia    · Zolpidem Tartrate 10 MG Oral Tablet; Take 1 tablet by mouth at bedtime   Rx By: Maxine Harinder; Dispense: 30 Days ; #:30 Tablet; Refill: 0; For: Insomnia; MARYA = N; Call Rx    Discussion/Summary  Discussion Summary:   Patient is here for post hospital visit  She had a "convulsive syncopal" episode  She may have been dehydrated and taking too much diabetes medication  She had been seeing a specialist for her diabetes and thyroid   She has decided she wants us to follow her diabetes and thyroid along with her other medical conditions  I said that we would be happy to do that  She feels that she learned a lot about diabetes, nutrition while in the hospital  Her meds have been adjusted  I gave her an Rx for Zolpidem for sleeplessness and she can use the Alprazolam during the day  Medication SE Review and Pt Understands Tx: Possible side effects of new medications were reviewed with the patient/guardian today  The treatment plan was reviewed with the patient/guardian  The patient/guardian understands and agrees with the treatment plan      Chief Complaint  Chief Complaint Free Text Note Form: Pt presents for f/u from ER in [facility] due to syncope episodes and tonic-clonic movements  Pt states that she has been feeling fatigue followed by sleeping a lot, she also has been having diarrhea which she thinks it was due to an increase dose of Metformin to 1500 mg qd  But her diarrhea sxs has subsided a few days ago, Pt had CT of the head, MRI of the brain, and an EEG along with BW  History of Present Illness  TCM Communication [facility]: The patient is being contacted for follow-up after hospitalization and [facility] visit scheduled for 7/27/16 with Dr Lorraine Smiley  She was hospitalized at [place] [address]  The date of admission: 07/16/2016, date of discharge: 07/18/2016  Diagnosis: 1  Acute encephalopathy  She was discharged to home  Medications were not reviewed today  She scheduled a follow up appointment  The patient is currently asymptomatic  Referrals Needed:  None  Communication performed and completed by Jovon Deras   HPI: Patient had a "convulsive syncope'  Was admitted to [facility] - had MRI, EEG and meds were adjusted  She would like to start coming here for all her medical issues instead of seeing endocrinology, She is also having issues with sleep        Review of Systems  Complete-Female:   Constitutional: No fever, no chills, feels well, no tiredness, no recent weight gain or weight loss  Cardiovascular: No complaints of slow heart rate, no fast heart rate, no chest pain, no palpitations, no leg claudication, no lower extremity edema  Respiratory: No complaints of shortness of breath, no wheezing, no cough, no SOB on exertion, no orthopnea, no PND  Gastrointestinal: No complaints of abdominal pain, no constipation, no nausea or vomiting, no diarrhea, no bloody stools  Active Problems    1  Acquired hypothyroidism (244 9) (E03 9)   2  Acute recurrent pansinusitis (461 8) (J01 41)   3  Anxiety (300 00) (F41 9)   4  Arthritis (716 90) (M19 90)   5  Benign essential hypertension (401 1) (I10)   6  Colonoscopy (Fiberoptic) Screening   7  Depression (311) (F32 9)   8  Fibromyalgia (729 1) (M79 7)   9  Graves' disease (242 00) (E05 00)   10  Hypertriglyceridemia (272 1) (E78 1)   11  Insomnia (780 52) (G47 00)   12  Peripheral neuropathy (356 9) (G62 9)   13  Rheumatoid arthritis (714 0) (M06 9)   14  Skin lesion (709 9) (L98 9)   15  Type 2 diabetes mellitus (250 00) (E11 9)   16  Vitamin D deficiency (268 9) (E55 9)    Past Medical History    1  History of Acute conjunctivitis (372 00) (H10 30)   2  History of Acute pansinusitis, recurrence not specified (461 8) (J01 40)   3  History of Acute sinusitis (461 9) (J01 90)   4  History of Anxiety (300 00) (F41 9)   5  History of Arthritis (V13 4)   6  History of Benign colon polyp (211 3) (K63 5)   7  History of Calcific tendinitis (727 82) (M65 20)   8  History of Colon, diverticulosis (562 10) (K57 30)   9  History of Degenerative disc disease, cervical (722 4) (M50 30)   10  History of Depression (311) (F32 9)   11  History of Encounter for screening mammogram for malignant neoplasm of breast    (V76 12) (Z12 31)   12  History of Foot deformity (736 70) (M21 969)   13  History of acute gastritis (V12 70) (Z87 19)   14  History of allergy (V15 09) (Z88 9)   15   History of gastroesophageal reflux (GERD) (V12 79) (Z87 19)   16  History of Graves' disease (V12 29) (Z86 39)   17  History of heartburn (V12 79) (Z87 898)   18  History of hyperthyroidism (V12 29) (Z86 39)   19  History of Multiple joint pain (719 49) (M25 50)   20  History of Neck muscle spasm (728 85) (M62 838)   21  History of Need for 23-polyvalent pneumococcal polysaccharide vaccine (V03 82) (Z23)   22  History of Need for immunization against influenza (V04 81) (Z23)   23  History of Open Wound Of The Hand (882 0)   24  History of Other chronic pain (338 29) (G89 29)   25  History of Other chronic pain (338 29) (G89 29)   26  History of Pelvic Fracture (808 8)   27  History of Routine Gynecological Exam With Cervical Pap Smear (V72 31)   28  History of Shoulder pain, left (719 41) (M25 512)    Surgical History    1  History of Colonoscopy (Fiberoptic)    Family History  Mother    1  Family history of    2  Family history of diabetes mellitus (V18 0) (Z83 3)  Family History    3  Family history of Coronary Artery Disease (V17 49)   4  Family history of Dementia   5  Family history of Diabetes Mellitus (V18 0)    Social History    · Denied: History of Being A Social Drinker   · Former smoker (P51 25) (M72 004)  Social History Reviewed: The social history was reviewed and updated today  The social history was reviewed and is unchanged  Current Meds   1  ALPRAZolam 1 MG Oral Tablet; TAKE ONE (1) TABLET(S) EVERY 8 HOURS ASNEEDED; Therapy: 33MJV4427 to (Evaluate:83Bij5293)  Requested for: 59YAA6296; Last   Rx:2016 Ordered   2  Atenolol 50 MG Oral Tablet; TAKE 1 TABLET DAILY  Requested for: 38FMK7291; Last   Rx:21Aim7308 Ordered   3  Cyclobenzaprine HCl - 10 MG Oral Tablet; TAKE 1 TABLET TWICE DAILY AS NEEDED; Therapy: 32Mmz9264 to (Evaluate:93Xib3915)  Requested for: 95SKT4735; Last   ZN:93NJH8616 Ordered   4  Losartan Potassium 50 MG Oral Tablet; Take 1 tablet daily;    Therapy: 58CYU1070 to (Evaluate:2016)  Requested for: 28ARN4152; Last   FO:01YLY2204 Ordered   5  MetFORMIN HCl - 500 MG Oral Tablet; take 2 tablet daily; Therapy: 60JWW2947 to Recorded   6  Multivitamins Oral Capsule; TAKE 1 CAPSULE DAILY; Therapy: 82UAH8131 to Recorded   7  RABEprazole Sodium 20 MG Oral Tablet Delayed Release; take one tablet by mouth   every day; Therapy: 84FZE0836 to (Evaluate:28Jun2016)  Requested for: 29Apr2016; Last   Rx:29Apr2016 Ordered   8  Vitamin D3 400 UNIT Oral Tablet; take 2 tablet daily; Therapy: 01NLA2235 to Recorded  Medication List Reviewed: The medication list was reviewed and updated today  Allergies    1  Aspirin TABS   2  NSAIDs    3  Bee sting   4  Eggs   5  Food   6  Latex   7  Soy    Vitals  Signs   Recorded: 06GTT3652 37:74ZE   Systolic: 882, LUE, Sitting  Diastolic: 84, LUE, Sitting  Heart Rate: 78  Pulse Quality: Norm  Respiration Quality: Norm  Respiration: 17  Temperature: 98 5 F, Tympanic  Pain Scale: 0  O2 Saturation: 95  Height: 5 ft 5 5 in  Weight: 203 lb 3 04 oz  BMI Calculated: 33 3  BSA Calculated: 1 99    Physical Exam    Constitutional   General appearance: No acute distress, well appearing and well nourished  Pulmonary   Respiratory effort: No increased work of breathing or signs of respiratory distress  Auscultation of lungs: Clear to auscultation  Cardiovascular   Auscultation of heart: Normal rate and rhythm, normal S1 and S2, without murmurs  Examination of extremities for edema and/or varicosities: Normal     Carotid pulses: Normal     Lymphatic   Palpation of lymph nodes in neck: No lymphadenopathy  Musculoskeletal   Gait and station: Normal     Skin   Skin and subcutaneous tissue: Normal without rashes or lesions  Psychiatric   Orientation to person, place, and time: Normal     Mood and affect: Normal          Health Management  History of Encounter for screening mammogram for malignant neoplasm of breast   DIGTL SCRN MAMMO W/CAD; every 1 year; Last 42BKZ9384;  Next Due: 81RHP4161;  Overdue    Future Appointments    Date/Time Provider Specialty Site   11/01/2016 01:15 PM David Owen DO Family Medicine [address]   08/29/2016 09:45 AM Victor Hugo Odell MD General Surgery [facility]     Signatures   Electronically signed by : Tabatha Alvarado DO; Jul 29 2016 11:54AM EST                       (Author)

## 2018-01-17 NOTE — RESULT NOTES
Verified Results  (1) CBC/PLT/DIFF 31Oct2017 09:29AM Luan Jenkins     Test Name Result Flag Reference   WBC COUNT 6 02 Thousand/uL  4 31-10 16   RBC COUNT 4 77 Million/uL  3 81-5 12   HEMOGLOBIN 14 8 g/dL  11 5-15 4   HEMATOCRIT 45 2 %  34 8-46  1   MCV 95 fL  82-98   MCH 31 0 pg  26 8-34 3   MCHC 32 7 g/dL  31 4-37 4   RDW 13 3 %  11 6-15 1   MPV 11 9 fL  8 9-12 7   PLATELET COUNT 364 Thousands/uL  149-390   nRBC AUTOMATED 0 /100 WBCs     NEUTROPHILS RELATIVE PERCENT 57 %  43-75   LYMPHOCYTES RELATIVE PERCENT 26 %  14-44   MONOCYTES RELATIVE PERCENT 10 %  4-12   EOSINOPHILS RELATIVE PERCENT 6 %  0-6   BASOPHILS RELATIVE PERCENT 1 %  0-1   NEUTROPHILS ABSOLUTE COUNT 3 53 Thousands/? ??L  1 85-7 62   LYMPHOCYTES ABSOLUTE COUNT 1 54 Thousands/? ??L  0 60-4 47   MONOCYTES ABSOLUTE COUNT 0 57 Thousand/? ??L  0 17-1 22   EOSINOPHILS ABSOLUTE COUNT 0 33 Thousand/? ??L  0 00-0 61   BASOPHILS ABSOLUTE COUNT 0 05 Thousands/? ??L  0 00-0 10     (1) COMPREHENSIVE METABOLIC PANEL [id number] 20:88PK Luan Jenkins     Test Name Result Flag Reference   SODIUM 141 mmol/L  136-145   POTASSIUM 4 0 mmol/L  3 5-5 3   CHLORIDE 102 mmol/L  100-108   CARBON DIOXIDE 31 mmol/L  21-32   ANION GAP (CALC) 8 mmol/L  4-13   BLOOD UREA NITROGEN 19 mg/dL  5-25   CREATININE 0 85 mg/dL  0 60-1 30   Standardized to IDMS reference method   CALCIUM 9 7 mg/dL  8 3-10 1   BILI, TOTAL 0 52 mg/dL  0 20-1 00   ALK PHOSPHATAS 66 U/L     ALT (SGPT) 45 U/L  12-78   Specimen collection should occur prior to Sulfasalazine administration due to the potential for falsely depressed results  AST(SGOT) 25 U/L  5-45   Specimen collection should occur prior to Sulfasalazine administration due to the potential for falsely depressed results     ALBUMIN 4 5 g/dL  3 5-5 0   TOTAL PROTEIN 8 3 g/dL H 6 4-8 2   eGFR 76 ml/min/1 73sq m     National Kidney Disease Education Program recommendations are as follows:  GFR calculation is accurate only with a steady state creatinine  Chronic Kidney disease less than 60 ml/min/1 73 sq  meters  Kidney failure less than 15 ml/min/1 73 sq  meters  GLUCOSE FASTING 136 mg/dL H 65-99   Specimen collection should occur prior to Sulfasalazine administration due to the potential for falsely depressed results  Specimen collection should occur prior to Sulfapyridine administration due to the potential for falsely elevated results  (1) HEMOGLOBIN A1C 31Oct2017 09:29AM David Todd     Test Name Result Flag Reference   HEMOGLOBIN A1C 5 9 %  4 2-6 3   EST  AVG  GLUCOSE 123 mg/dl       (1) LIPID PANEL FASTING W DIRECT LDL REFLEX 31Oct2017 09:29AM David Todd     Test Name Result Flag Reference   CHOLESTEROL 254 mg/dL H    LDL CHOLESTEROL CALCULATED 154 mg/dL H 0-100   Triglyceride:        Normal <150 mg/dl   Borderline High 150-199 mg/dl   High 200-499 mg/dl   Very High >499 mg/dl      Cholesterol:       Desirable <200 mg/dl    Borderline High 200-239 mg/dl    High >239 mg/dl      HDL Cholesterol:       High>59 mg/dL    Low <41 mg/dL      HDL Cholesterol:       High>59 mg/dL    Low <41 mg/dL      This screening LDL is a calculated result  It does not have the accuracy of the Direct Measured LDL in the monitoring of patients with hyperlipidemia and/or statin therapy  Direct Measure LDL (BLR292) must be ordered separately in these patients  TRIGLYCERIDES 201 mg/dL H <=150   Specimen collection should occur prior to N-Acetylcysteine or Metamizole administration due to the potential for falsely depressed results  HDL,DIRECT 60 mg/dL  40-60   Specimen collection should occur prior to Metamizole administration due to the potential for falsley depressed results       (1) T3 TOTAL 31Oct2017 09:29AM David Todd     Test Name Result Flag Reference   T3 1 20 ng/mL  0 60-1 80     (1) T4, FREE 31Oct2017 09:29AM David Todd     Test Name Result Flag Reference   T4,FREE 0 93 ng/dL  0 76-1 46   Specimen collection should occur prior to Sulfasalazine administration due to the potential for falsely elevated results       (1) THYROID ANTIBODIES PANEL [id number] 09:29AM Anand Ramon    Order Number: [id number]     Test Name Result Flag Reference   THYROGLOB AB 20 7 IU/mL H 0 0 - 0 9   Thyroglobulin Antibody measured by [facility] Methodology    Performed at:  [address]  [id number]  : Angelica Law MD, Phone:  [phone]   [facility] MICROSOM  IU/mL H 0 - 34   Performed at:  [address]  [id number]  : Angelica Law MD, Phone:  [phone]

## 2018-01-17 NOTE — MISCELLANEOUS
Message  Mailed colono letter to patients home address  Tasked PCPs office  {Updated pre-visit planning to reflect 3 year follow up  }      Active Problems    1  Acquired hypothyroidism (244 9) (E03 9)   2  Acute recurrent pansinusitis (461 8) (J01 41)   3  Anxiety (300 00) (F41 9)   4  Arthritis (716 90) (M19 90)   5  Benign essential hypertension (401 1) (I10)   6  Colonoscopy (Fiberoptic) Screening   7  Depression (311) (F32 9)   8  Diarrhea, unspecified type (787 91) (R19 7)   9  Fibromyalgia (729 1) (M79 7)   10  Graves' disease (242 00) (E05 00)   11  Hypertriglyceridemia (272 1) (E78 1)   12  Insomnia (780 52) (G47 00)   13  Need for immunization against influenza (V04 81) (Z23)   14  Peripheral neuropathy (356 9) (G62 9)   15  Rheumatoid arthritis (714 0) (M06 9)   16  Skin lesion (709 9) (L98 9)   17  Type 2 diabetes mellitus (250 00) (E11 9)   18  Vitamin D deficiency (268 9) (E55 9)    Current Meds   1  ALPRAZolam 1 MG Oral Tablet (Xanax); TAKE ONE (1) TABLET(S) EVERY 8 HOURS   ASNEEDED; Therapy: 96AQH4737 to (Evaluate:10Oct2016)  Requested for: 45XRD4330; Last   Rx:[id number] Ordered   2  Atenolol 50 MG Oral Tablet; TAKE 1 TABLET DAILY  Requested for: 66IBV0961; Last   Rx:[id number] Ordered   3  Isaiah Contour Next EZ w/Device Kit; Patient tests once a day; Therapy: 91VHV8707 to (Last Elnita Gosselin)  Requested for: 82DLO4097 Ordered   4  Isaiah Contour Next Test In [employer]; TEST ONCE DAILY; Therapy: 68UYY5385 to (Last Rx:08Ahr8184)  Requested for: 07Boj4102 Ordered   5  Cyclobenzaprine HCl - 10 MG Oral Tablet; TAKE 1 TABLET TWICE DAILY AS NEEDED; Therapy: 41Zoz9228 to (Evaluate:16Qje9109)  Requested for: 77AUW6556; Last   LJ:[id number] Ordered   6  Losartan Potassium 50 MG Oral Tablet; Take 1 tablet daily; Therapy: 94TNF9893 to (Evaluate:31Oct2016)  Requested for: 48RHH8087; Last   RD:21DHA9532 Ordered   7  Multivitamins Oral Capsule; TAKE 1 CAPSULE DAILY; Therapy: 82RXJ9990 to Recorded   8  RABEprazole Sodium 20 MG Oral Tablet Delayed Release (Aciphex); take one tablet by   mouth every day; Therapy: 51LCS7262 to (Evaluate:11Nov2016)  Requested for: 13Sep2016; Last   Rx:41Mfe8027 Ordered   9  Vitamin D3 400 UNIT Oral Tablet; take 2 tablet daily; Therapy: 29WTC2825 to Recorded   10  Zolpidem Tartrate 10 MG Oral Tablet; Take 1 tablet by mouth at bedtime; Therapy: 99Vpn4108 to (Evaluate:20Oct2016); Last Rx:20Sep2016 Ordered    Allergies    1  Aspirin TABS   2  NSAIDs    3  Bee sting   4  Eggs   5  Food   6  Latex   7  Soy    Plan  PMH: History of colonic polyps    · COLONOSCOPY ; every 3 years;  Last 17RDK8287; Next 22CEL5386; Status:Active    Signatures   Electronically signed by : Belkys Guerin, ; Sep 22 2016  1:23PM EST                       (Author)

## 2018-01-22 VITALS
SYSTOLIC BLOOD PRESSURE: 118 MMHG | HEART RATE: 88 BPM | TEMPERATURE: 99 F | DIASTOLIC BLOOD PRESSURE: 80 MMHG | BODY MASS INDEX: 31.52 KG/M2 | RESPIRATION RATE: 18 BRPM | WEIGHT: 189.19 LBS | HEIGHT: 65 IN | OXYGEN SATURATION: 98 %

## 2018-01-23 NOTE — RESULT NOTES
Verified Results  (1) TSH WITH FT4 REFLEX [id number] 08:09AM [facility]Achyluis [facility]     Test Name Result Flag Reference   TSH 5 000 uIU/mL H 0 358-3 740   Patients undergoing fluorescein dye angiography may retain small amounts of fluorescein in the body for 48-72 hours post procedure  Samples containing fluorescein can produce falsely depressed TSH values  If the patient had this procedure,a specimen should be resubmitted post fluorescein clearance  The recommended reference ranges for TSH during pregnancy are as follows:  First trimester 0 1 to 2 5 uIU/mL  Second trimester  0 2 to 3 0 uIU/mL  Third trimester 0 3 to 3 0 uIU/m   T4,FREE 1 04 ng/dL  0 76-1 46   Specimen collection should occur prior to Sulfasalazine administration due to the potential for falsely elevated results  (1) CORTISOL AM SPECIMEN [id number] 08:09AM [facility]     Test Name Result Flag Reference   CORTISO AM SPEC 29 2 ug/dL H 4 2-22 4   This is a patient instruction: This test must be collected between 7-9 AM  This test may exhibit interference when sample is collected from a person who is consuming a supplement with a high dose of biotin (also termed as vitamin B7 or B8, vitamin H or coenzyme R)  Patient should stop biotin consumption at least 72 hours prior to the collection of the sample  Reference ranges established for specimens drawn between 7 and 9 am  Results may be inaccurate if timing is not correct

## 2018-01-24 VITALS
DIASTOLIC BLOOD PRESSURE: 80 MMHG | OXYGEN SATURATION: 99 % | TEMPERATURE: 98.3 F | SYSTOLIC BLOOD PRESSURE: 120 MMHG | BODY MASS INDEX: 33.73 KG/M2 | HEIGHT: 64 IN | RESPIRATION RATE: 17 BRPM | WEIGHT: 197.56 LBS | HEART RATE: 82 BPM

## 2018-02-05 DIAGNOSIS — E03.9 HYPOTHYROIDISM, ADULT: ICD-10-CM

## 2018-02-05 DIAGNOSIS — E11.8 TYPE 2 DIABETES MELLITUS WITH COMPLICATION, UNSPECIFIED LONG TERM INSULIN USE STATUS: Primary | ICD-10-CM

## 2018-02-06 ENCOUNTER — CLINICAL SUPPORT (OUTPATIENT)
Dept: FAMILY MEDICINE CLINIC | Facility: CLINIC | Age: 58
End: 2018-02-06
Payer: MEDICARE

## 2018-02-06 DIAGNOSIS — E03.9 HYPOTHYROIDISM, UNSPECIFIED TYPE: ICD-10-CM

## 2018-02-06 DIAGNOSIS — E11.8 DIABETIC COMPLICATION (HCC): Primary | ICD-10-CM

## 2018-02-06 LAB
ALBUMIN SERPL BCP-MCNC: 3.9 G/DL (ref 3.5–5)
ALP SERPL-CCNC: 78 U/L (ref 46–116)
ALT SERPL W P-5'-P-CCNC: 84 U/L (ref 12–78)
ANION GAP SERPL CALCULATED.3IONS-SCNC: 8 MMOL/L (ref 4–13)
AST SERPL W P-5'-P-CCNC: 48 U/L (ref 5–45)
BILIRUB SERPL-MCNC: 0.41 MG/DL (ref 0.2–1)
BUN SERPL-MCNC: 17 MG/DL (ref 5–25)
CALCIUM SERPL-MCNC: 9.2 MG/DL (ref 8.3–10.1)
CHLORIDE SERPL-SCNC: 101 MMOL/L (ref 100–108)
CO2 SERPL-SCNC: 29 MMOL/L (ref 21–32)
CREAT SERPL-MCNC: 0.67 MG/DL (ref 0.6–1.3)
EST. AVERAGE GLUCOSE BLD GHB EST-MCNC: 128 MG/DL
GFR SERPL CREATININE-BSD FRML MDRD: 98 ML/MIN/1.73SQ M
GLUCOSE P FAST SERPL-MCNC: 129 MG/DL (ref 65–99)
HBA1C MFR BLD: 6.1 % (ref 4.2–6.3)
POTASSIUM SERPL-SCNC: 4.8 MMOL/L (ref 3.5–5.3)
PROT SERPL-MCNC: 7.3 G/DL (ref 6.4–8.2)
SODIUM SERPL-SCNC: 138 MMOL/L (ref 136–145)
T4 FREE SERPL-MCNC: 0.85 NG/DL (ref 0.76–1.46)
TSH SERPL DL<=0.05 MIU/L-ACNC: 3.32 UIU/ML (ref 0.36–3.74)

## 2018-02-06 PROCEDURE — 84443 ASSAY THYROID STIM HORMONE: CPT | Performed by: FAMILY MEDICINE

## 2018-02-06 PROCEDURE — 83036 HEMOGLOBIN GLYCOSYLATED A1C: CPT | Performed by: FAMILY MEDICINE

## 2018-02-06 PROCEDURE — 36415 COLL VENOUS BLD VENIPUNCTURE: CPT | Performed by: FAMILY MEDICINE

## 2018-02-06 PROCEDURE — 80053 COMPREHEN METABOLIC PANEL: CPT | Performed by: FAMILY MEDICINE

## 2018-02-06 PROCEDURE — 84439 ASSAY OF FREE THYROXINE: CPT | Performed by: FAMILY MEDICINE

## 2018-02-12 RX ORDER — SERTRALINE HYDROCHLORIDE 100 MG/1
100 TABLET, FILM COATED ORAL DAILY
Refills: 3 | COMMUNITY
Start: 2018-01-20 | End: 2018-10-17 | Stop reason: SDUPTHER

## 2018-02-12 RX ORDER — IBUPROFEN 800 MG
2 TABLET ORAL DAILY
COMMUNITY
Start: 2014-06-04

## 2018-02-12 RX ORDER — SIMVASTATIN 20 MG
1 TABLET ORAL DAILY
COMMUNITY
Start: 2017-03-30 | End: 2018-03-26 | Stop reason: SDUPTHER

## 2018-02-12 RX ORDER — MULTIVITAMIN
1 CAPSULE ORAL DAILY
COMMUNITY
Start: 2014-06-04

## 2018-02-12 RX ORDER — METOCLOPRAMIDE 5 MG/1
5 TABLET ORAL
Refills: 5 | COMMUNITY
Start: 2017-12-26

## 2018-02-12 RX ORDER — LEVOTHYROXINE SODIUM 0.05 MG/1
1 TABLET ORAL DAILY
COMMUNITY
Start: 2017-11-02 | End: 2018-02-13 | Stop reason: SDUPTHER

## 2018-02-12 RX ORDER — RABEPRAZOLE SODIUM 20 MG/1
20 TABLET, DELAYED RELEASE ORAL DAILY
Refills: 1 | COMMUNITY
Start: 2017-12-26 | End: 2018-02-13 | Stop reason: SDUPTHER

## 2018-02-12 RX ORDER — ATENOLOL 100 MG/1
TABLET ORAL
Refills: 1 | COMMUNITY
Start: 2018-01-20 | End: 2018-02-13 | Stop reason: SDUPTHER

## 2018-02-13 ENCOUNTER — OFFICE VISIT (OUTPATIENT)
Dept: FAMILY MEDICINE CLINIC | Facility: CLINIC | Age: 58
End: 2018-02-13
Payer: MEDICARE

## 2018-02-13 VITALS
HEART RATE: 93 BPM | WEIGHT: 207.7 LBS | HEIGHT: 63 IN | OXYGEN SATURATION: 97 % | TEMPERATURE: 98.7 F | BODY MASS INDEX: 36.8 KG/M2 | SYSTOLIC BLOOD PRESSURE: 120 MMHG | DIASTOLIC BLOOD PRESSURE: 82 MMHG

## 2018-02-13 DIAGNOSIS — M15.9 PRIMARY OSTEOARTHRITIS INVOLVING MULTIPLE JOINTS: ICD-10-CM

## 2018-02-13 DIAGNOSIS — E11.9 TYPE 2 DIABETES MELLITUS WITHOUT COMPLICATION, WITHOUT LONG-TERM CURRENT USE OF INSULIN (HCC): ICD-10-CM

## 2018-02-13 DIAGNOSIS — K21.9 GASTROESOPHAGEAL REFLUX DISEASE, ESOPHAGITIS PRESENCE NOT SPECIFIED: ICD-10-CM

## 2018-02-13 DIAGNOSIS — F41.9 ANXIETY: ICD-10-CM

## 2018-02-13 DIAGNOSIS — E55.9 HYPOVITAMINOSIS D: ICD-10-CM

## 2018-02-13 DIAGNOSIS — F41.9 ANXIETY: Primary | ICD-10-CM

## 2018-02-13 DIAGNOSIS — M79.7 FIBROMYALGIA: ICD-10-CM

## 2018-02-13 DIAGNOSIS — R79.89 ABNORMAL LFTS (LIVER FUNCTION TESTS): ICD-10-CM

## 2018-02-13 DIAGNOSIS — E03.9 ACQUIRED HYPOTHYROIDISM: ICD-10-CM

## 2018-02-13 DIAGNOSIS — I10 ESSENTIAL HYPERTENSION: ICD-10-CM

## 2018-02-13 DIAGNOSIS — I10 HYPERTENSION, UNSPECIFIED TYPE: ICD-10-CM

## 2018-02-13 PROCEDURE — 99214 OFFICE O/P EST MOD 30 MIN: CPT | Performed by: FAMILY MEDICINE

## 2018-02-13 RX ORDER — ATENOLOL 50 MG/1
1 TABLET ORAL DAILY
COMMUNITY
Start: 2016-11-01 | End: 2018-02-13 | Stop reason: SDUPTHER

## 2018-02-13 RX ORDER — CYCLOBENZAPRINE HCL 10 MG
1 TABLET ORAL 2 TIMES DAILY PRN
COMMUNITY
Start: 2013-08-08 | End: 2018-05-29 | Stop reason: SDUPTHER

## 2018-02-13 RX ORDER — LEVOTHYROXINE SODIUM 0.07 MG/1
75 TABLET ORAL DAILY
Qty: 90 TABLET | Refills: 1 | Status: SHIPPED | OUTPATIENT
Start: 2018-02-13 | End: 2018-08-08 | Stop reason: SDUPTHER

## 2018-02-13 RX ORDER — ALPRAZOLAM 1 MG/1
1 TABLET ORAL EVERY 6 HOURS PRN
Qty: 60 TABLET | Refills: 0 | OUTPATIENT
Start: 2018-02-13 | End: 2018-03-08 | Stop reason: SDUPTHER

## 2018-02-13 RX ORDER — PREGABALIN 50 MG/1
50 CAPSULE ORAL 3 TIMES DAILY
Qty: 90 CAPSULE | Refills: 0 | Status: SHIPPED | OUTPATIENT
Start: 2018-02-13 | End: 2018-03-26 | Stop reason: SINTOL

## 2018-02-13 RX ORDER — ALPRAZOLAM 1 MG/1
TABLET ORAL
COMMUNITY
Start: 2013-07-22 | End: 2018-02-13 | Stop reason: SDUPTHER

## 2018-02-13 RX ORDER — RABEPRAZOLE SODIUM 20 MG/1
20 TABLET, DELAYED RELEASE ORAL DAILY
Qty: 90 TABLET | Refills: 1 | Status: SHIPPED | OUTPATIENT
Start: 2018-02-13 | End: 2019-01-09 | Stop reason: SDUPTHER

## 2018-02-13 RX ORDER — LOSARTAN POTASSIUM 50 MG/1
1 TABLET ORAL DAILY
COMMUNITY
Start: 2016-05-04 | End: 2018-03-26 | Stop reason: SDUPTHER

## 2018-02-13 RX ORDER — LOSARTAN POTASSIUM 50 MG/1
50 TABLET ORAL DAILY
Qty: 90 TABLET | Refills: 1 | Status: SHIPPED | OUTPATIENT
Start: 2018-02-13 | End: 2018-03-26 | Stop reason: SDUPTHER

## 2018-02-13 NOTE — PROGRESS NOTES
Assessment/Plan:    Type 2 diabetes mellitus ([facility] [address] )  A her diabetes is controlled with diet  Her hemoglobin A1c was below 6  Graves disease  Patient's thyroid is now on her active  I started her on levothyroxine and her TSH is now a little higher than 3  I am going to increase her dose slightly as she still has symptoms of an under active thyroid  Essential hypertension  Her blood pressure is controlled on atenolol  Patient's liver function tests were elevated  She does say that she has been drinking a glass of wine every night   She has discontinued this since she has seen her results and I will recheck her liver function tests before she sees me in a month  We discussed her chronic pain and will try Lyrica 50 milligram   I told her to start just 1 pill at bedtime  She did ask me about medical cannabis and I told her I was not familiar with how it was going to work  She does have a list of physicians who to participate in this program        Diagnoses and all orders for this visit:    Anxiety  -     ALPRAZolam (XANAX) 1 mg tablet; Take 1 tablet (1 mg total) by mouth every 6 (six) hours as needed for anxiety    Essential hypertension    Hypertension, unspecified type  -     losartan (COZAAR) 50 mg tablet; Take 1 tablet (50 mg total) by mouth daily    Type 2 diabetes mellitus without complication, without long-term current use of insulin ([facility])    Acquired hypothyroidism  -     levothyroxine 75 mcg tablet; Take 1 tablet (75 mcg total) by mouth daily for 90 days    Gastroesophageal reflux disease, esophagitis presence not specified  -     RABEprazole (ACIPHEX) 20 MG tablet; Take 1 tablet (20 mg total) by mouth daily    Abnormal LFTs (liver function tests)  -     Hepatic function panel; Future    Hypovitaminosis D    Primary osteoarthritis involving multiple joints    Fibromyalgia  -     pregabalin (LYRICA) 50 mg capsule;  Take 1 capsule (50 mg total) by mouth 3 (three) times a day for 30 days Subjective:      Patient ID: Barbra Colbert is a 62 y o  female  Patient is seen for diabetes follow up and thyroid  She did have blood work  Her tremors are better  Diabetes     Hypertension         The following portions of the patient's history were reviewed and updated as appropriate: allergies, current medications, past family history, past medical history, past social history, past surgical history and problem list     Review of Systems   Constitutional: Negative  HENT: Negative  Respiratory: Negative  Cardiovascular: Negative  Gastrointestinal: Negative  Musculoskeletal: Positive for arthralgias  Psychiatric/Behavioral: Negative  Objective:    Vitals:    02/13/18 1014   BP: 120/82   Pulse:    Temp:    SpO2:         Physical Exam   Constitutional: She is oriented to person, place, and time  She appears well-developed and well-nourished  Neck: No thyromegaly present  Cardiovascular: Normal rate, regular rhythm and normal heart sounds  Pulmonary/Chest: Effort normal and breath sounds normal    Lymphadenopathy:     She has no cervical adenopathy  Neurological: She is alert and oriented to person, place, and time  Skin: Skin is warm and dry  Psychiatric: She has a normal mood and affect  Nursing note and vitals reviewed

## 2018-02-13 NOTE — PATIENT INSTRUCTIONS
Take Lyrica just at bedtime and call me in a week or two about how it is doing  Recheck liver function tests in two weeks  See me in a month

## 2018-02-14 NOTE — TELEPHONE ENCOUNTER
I called [facility] in [place] and spoke to the pharmacist Chandan I gave a verbal for pt's RX Alprazolam to the pharmacy  Chandan  spoke to [place] and they are doing a prior Auth on Raebeprozole 20MG

## 2018-02-14 NOTE — TELEPHONE ENCOUNTER
Benedict Choudhary the pharmacist called in regards to pt's prescription  Rabeprazole 20 MG it is not covered by her insurance  Pt's insurance will cover Omeprazole and or pantoprazole they are asking if you please send in a new rx for patient to [facility] in [place]

## 2018-02-14 NOTE — ASSESSMENT & PLAN NOTE
Patient's thyroid is now on her active  I started her on levothyroxine and her TSH is now a little higher than 3  I am going to increase her dose slightly as she still has symptoms of an under active thyroid

## 2018-02-19 RX ORDER — RABEPRAZOLE SODIUM 20 MG/1
20 TABLET, DELAYED RELEASE ORAL DAILY
Qty: 90 TABLET | Refills: 0 | Status: CANCELLED | OUTPATIENT
Start: 2018-02-19

## 2018-02-19 RX ORDER — ALPRAZOLAM 1 MG/1
1 TABLET ORAL EVERY 6 HOURS PRN
Qty: 60 TABLET | Refills: 0 | Status: CANCELLED | OUTPATIENT
Start: 2018-02-19

## 2018-02-27 ENCOUNTER — DOCUMENTATION (OUTPATIENT)
Dept: FAMILY MEDICINE CLINIC | Facility: CLINIC | Age: 58
End: 2018-02-27

## 2018-03-08 DIAGNOSIS — F41.9 ANXIETY: ICD-10-CM

## 2018-03-08 RX ORDER — ALPRAZOLAM 1 MG/1
1 TABLET ORAL 2 TIMES DAILY PRN
Qty: 60 TABLET | Refills: 0 | OUTPATIENT
Start: 2018-03-08 | End: 2018-03-26 | Stop reason: SDUPTHER

## 2018-03-08 RX ORDER — ALPRAZOLAM 1 MG/1
1 TABLET ORAL 2 TIMES DAILY PRN
Qty: 60 TABLET | Refills: 0 | Status: CANCELLED | OUTPATIENT
Start: 2018-03-08

## 2018-03-15 ENCOUNTER — CLINICAL SUPPORT (OUTPATIENT)
Dept: FAMILY MEDICINE CLINIC | Facility: CLINIC | Age: 58
End: 2018-03-15
Payer: MEDICARE

## 2018-03-15 DIAGNOSIS — R79.89 ABNORMAL LFTS (LIVER FUNCTION TESTS): ICD-10-CM

## 2018-03-15 LAB
ALBUMIN SERPL BCP-MCNC: 3.9 G/DL (ref 3.5–5)
ALP SERPL-CCNC: 76 U/L (ref 46–116)
ALT SERPL W P-5'-P-CCNC: 33 U/L (ref 12–78)
AST SERPL W P-5'-P-CCNC: 21 U/L (ref 5–45)
BILIRUB DIRECT SERPL-MCNC: 0.09 MG/DL (ref 0–0.2)
BILIRUB SERPL-MCNC: 0.26 MG/DL (ref 0.2–1)
PROT SERPL-MCNC: 7.3 G/DL (ref 6.4–8.2)

## 2018-03-15 PROCEDURE — 80076 HEPATIC FUNCTION PANEL: CPT | Performed by: FAMILY MEDICINE

## 2018-03-15 PROCEDURE — 36415 COLL VENOUS BLD VENIPUNCTURE: CPT | Performed by: FAMILY MEDICINE

## 2018-03-26 ENCOUNTER — OFFICE VISIT (OUTPATIENT)
Dept: FAMILY MEDICINE CLINIC | Facility: CLINIC | Age: 58
End: 2018-03-26
Payer: MEDICARE

## 2018-03-26 VITALS
DIASTOLIC BLOOD PRESSURE: 90 MMHG | WEIGHT: 213.6 LBS | RESPIRATION RATE: 18 BRPM | BODY MASS INDEX: 37.85 KG/M2 | TEMPERATURE: 98.9 F | SYSTOLIC BLOOD PRESSURE: 130 MMHG | OXYGEN SATURATION: 93 % | HEART RATE: 93 BPM | HEIGHT: 63 IN

## 2018-03-26 DIAGNOSIS — F41.9 ANXIETY: ICD-10-CM

## 2018-03-26 DIAGNOSIS — E78.2 MIXED HYPERLIPIDEMIA: Primary | ICD-10-CM

## 2018-03-26 DIAGNOSIS — I10 ESSENTIAL HYPERTENSION: ICD-10-CM

## 2018-03-26 PROCEDURE — 99214 OFFICE O/P EST MOD 30 MIN: CPT | Performed by: FAMILY MEDICINE

## 2018-03-26 RX ORDER — SIMVASTATIN 20 MG
20 TABLET ORAL DAILY
Qty: 90 TABLET | Refills: 1 | Status: SHIPPED | OUTPATIENT
Start: 2018-03-26 | End: 2018-10-17 | Stop reason: SDUPTHER

## 2018-03-26 RX ORDER — LOSARTAN POTASSIUM 100 MG/1
100 TABLET ORAL DAILY
Qty: 90 TABLET | Refills: 1 | Status: SHIPPED | OUTPATIENT
Start: 2018-03-26 | End: 2018-10-17 | Stop reason: SDUPTHER

## 2018-03-26 RX ORDER — ALPRAZOLAM 1 MG/1
1 TABLET ORAL 2 TIMES DAILY PRN
Qty: 60 TABLET | Refills: 0 | Status: SHIPPED | OUTPATIENT
Start: 2018-03-26 | End: 2018-05-03 | Stop reason: SDUPTHER

## 2018-03-26 NOTE — PROGRESS NOTES
Assessment/Plan:    Essential hypertension  Blood pressure was slightly eelvated today - patient had been taking husbands Losartan  I sent a new Rx for 100 mg daily to pharmacy  She will call with any problems  Type 2 diabetes mellitus ([facility] [address] )  Patient is controlling diabetes with diet  Graves disease  Patient is feeling well on this dose of thyroid medication  I have asked patient to return in about two months and she will have blood work prior  I did decrease her Sertraline as she is feeling that she has no emotion  Diagnoses and all orders for this visit:    Mixed hyperlipidemia  -     simvastatin (ZOCOR) 20 mg tablet; Take 1 tablet (20 mg total) by mouth daily    Anxiety  -     ALPRAZolam (XANAX) 1 mg tablet; Take 1 tablet (1 mg total) by mouth 2 (two) times a day as needed for anxiety    Essential hypertension  -     losartan (COZAAR) 100 MG tablet; Take 1 tablet (100 mg total) by mouth daily          Subjective:      Patient ID: Rosemarie Sarmiento is a 62 y o  female  Patient is here to follow up on joint pain, thyroid  Her tremors are gone  She wants to cut back on her Sertraline - she just doesn't care  Feels that medicine is causing her not to care  The following portions of the patient's history were reviewed and updated as appropriate: allergies, current medications, past family history, past medical history, past social history, past surgical history and problem list     Review of Systems   Constitutional: Negative  HENT: Negative  Respiratory: Negative  Cardiovascular: Negative  Neurological: Negative for tremors  Psychiatric/Behavioral:        Feels flat  Objective:      /90 (BP Location: Left arm, Patient Position: Sitting, Cuff Size: Large)   Pulse 93   Temp 98 9 °F (37 2 °C) (Tympanic)   Resp 18   Ht 5' 3" (1 6 m)   Wt 96 9 kg (213 lb 9 6 oz)   SpO2 93%   Breastfeeding?  No   BMI 37 84 kg/m²          Physical Exam   Constitutional: She is oriented to person, place, and time  She appears well-developed  Neck: Carotid bruit is not present  Cardiovascular: Normal rate, regular rhythm and normal heart sounds  Pulmonary/Chest: Effort normal and breath sounds normal    Musculoskeletal: She exhibits no edema  Lymphadenopathy:     She has no cervical adenopathy  Neurological: She is alert and oriented to person, place, and time  Skin: Skin is warm and dry  Psychiatric: She has a normal mood and affect  Nursing note and vitals reviewed

## 2018-03-30 NOTE — ASSESSMENT & PLAN NOTE
Blood pressure was slightly eelvated today - patient had been taking husbands Losartan  I sent a new Rx for 100 mg daily to pharmacy  She will call with any problems

## 2018-05-03 DIAGNOSIS — F41.9 ANXIETY: ICD-10-CM

## 2018-05-03 RX ORDER — ALPRAZOLAM 1 MG/1
1 TABLET ORAL 2 TIMES DAILY PRN
Qty: 60 TABLET | Refills: 0 | Status: SHIPPED | OUTPATIENT
Start: 2018-05-03 | End: 2018-05-29 | Stop reason: SDUPTHER

## 2018-05-14 DIAGNOSIS — I10 ESSENTIAL HYPERTENSION: Primary | ICD-10-CM

## 2018-05-14 RX ORDER — ATENOLOL 100 MG/1
TABLET ORAL
Qty: 45 TABLET | Refills: 1 | Status: SHIPPED | OUTPATIENT
Start: 2018-05-14 | End: 2018-07-11 | Stop reason: DRUGHIGH

## 2018-05-21 ENCOUNTER — TELEPHONE (OUTPATIENT)
Dept: FAMILY MEDICINE CLINIC | Facility: CLINIC | Age: 58
End: 2018-05-21

## 2018-05-21 NOTE — TELEPHONE ENCOUNTER
Pt is coming in on 05/22/2018 for blood work if you can please place his orders in thank you       [facility] goes to [facility]

## 2018-05-22 ENCOUNTER — CLINICAL SUPPORT (OUTPATIENT)
Dept: FAMILY MEDICINE CLINIC | Facility: CLINIC | Age: 58
End: 2018-05-22
Payer: MEDICARE

## 2018-05-22 DIAGNOSIS — E05.00 GRAVES DISEASE: ICD-10-CM

## 2018-05-22 DIAGNOSIS — I10 ESSENTIAL HYPERTENSION: ICD-10-CM

## 2018-05-22 DIAGNOSIS — E11.9 TYPE 2 DIABETES MELLITUS WITHOUT COMPLICATION, WITHOUT LONG-TERM CURRENT USE OF INSULIN (HCC): Primary | ICD-10-CM

## 2018-05-22 DIAGNOSIS — E55.9 HYPOVITAMINOSIS D: ICD-10-CM

## 2018-05-22 DIAGNOSIS — M15.9 PRIMARY OSTEOARTHRITIS INVOLVING MULTIPLE JOINTS: ICD-10-CM

## 2018-05-22 DIAGNOSIS — E11.9 TYPE 2 DIABETES MELLITUS WITHOUT COMPLICATION, WITHOUT LONG-TERM CURRENT USE OF INSULIN (HCC): ICD-10-CM

## 2018-05-22 DIAGNOSIS — K21.9 GERD WITHOUT ESOPHAGITIS: ICD-10-CM

## 2018-05-22 LAB
ALBUMIN SERPL BCP-MCNC: 4.4 G/DL (ref 3.5–5)
ALP SERPL-CCNC: 68 U/L (ref 46–116)
ALT SERPL W P-5'-P-CCNC: 32 U/L (ref 12–78)
ANION GAP SERPL CALCULATED.3IONS-SCNC: 8 MMOL/L (ref 4–13)
AST SERPL W P-5'-P-CCNC: 18 U/L (ref 5–45)
BASOPHILS # BLD AUTO: 0.04 THOUSANDS/ΜL (ref 0–0.1)
BASOPHILS NFR BLD AUTO: 1 % (ref 0–1)
BILIRUB SERPL-MCNC: 0.6 MG/DL (ref 0.2–1)
BUN SERPL-MCNC: 15 MG/DL (ref 5–25)
CALCIUM SERPL-MCNC: 9.4 MG/DL (ref 8.3–10.1)
CHLORIDE SERPL-SCNC: 103 MMOL/L (ref 100–108)
CHOLEST SERPL-MCNC: 231 MG/DL (ref 50–200)
CO2 SERPL-SCNC: 27 MMOL/L (ref 21–32)
CREAT SERPL-MCNC: 0.82 MG/DL (ref 0.6–1.3)
EOSINOPHIL # BLD AUTO: 0.4 THOUSAND/ΜL (ref 0–0.61)
EOSINOPHIL NFR BLD AUTO: 6 % (ref 0–6)
ERYTHROCYTE [DISTWIDTH] IN BLOOD BY AUTOMATED COUNT: 14 % (ref 11.6–15.1)
EST. AVERAGE GLUCOSE BLD GHB EST-MCNC: 140 MG/DL
GFR SERPL CREATININE-BSD FRML MDRD: 80 ML/MIN/1.73SQ M
GLUCOSE P FAST SERPL-MCNC: 154 MG/DL (ref 65–99)
HBA1C MFR BLD: 6.5 % (ref 4.2–6.3)
HCT VFR BLD AUTO: 45.2 % (ref 34.8–46.1)
HDLC SERPL-MCNC: 53 MG/DL (ref 40–60)
HGB BLD-MCNC: 14.4 G/DL (ref 11.5–15.4)
LDLC SERPL CALC-MCNC: 137 MG/DL (ref 0–100)
LYMPHOCYTES # BLD AUTO: 2.59 THOUSANDS/ΜL (ref 0.6–4.47)
LYMPHOCYTES NFR BLD AUTO: 36 % (ref 14–44)
MCH RBC QN AUTO: 30.5 PG (ref 26.8–34.3)
MCHC RBC AUTO-ENTMCNC: 31.9 G/DL (ref 31.4–37.4)
MCV RBC AUTO: 96 FL (ref 82–98)
MONOCYTES # BLD AUTO: 0.72 THOUSAND/ΜL (ref 0.17–1.22)
MONOCYTES NFR BLD AUTO: 10 % (ref 4–12)
NEUTROPHILS # BLD AUTO: 3.48 THOUSANDS/ΜL (ref 1.85–7.62)
NEUTS SEG NFR BLD AUTO: 48 % (ref 43–75)
NRBC BLD AUTO-RTO: 0 /100 WBCS
PLATELET # BLD AUTO: 224 THOUSANDS/UL (ref 149–390)
PMV BLD AUTO: 12.9 FL (ref 8.9–12.7)
POTASSIUM SERPL-SCNC: 4.6 MMOL/L (ref 3.5–5.3)
PROT SERPL-MCNC: 7.5 G/DL (ref 6.4–8.2)
RBC # BLD AUTO: 4.72 MILLION/UL (ref 3.81–5.12)
SODIUM SERPL-SCNC: 138 MMOL/L (ref 136–145)
T4 FREE SERPL-MCNC: 1.03 NG/DL (ref 0.76–1.46)
TRIGL SERPL-MCNC: 205 MG/DL
TSH SERPL DL<=0.05 MIU/L-ACNC: 4.2 UIU/ML (ref 0.36–3.74)
WBC # BLD AUTO: 7.24 THOUSAND/UL (ref 4.31–10.16)

## 2018-05-22 PROCEDURE — 80053 COMPREHEN METABOLIC PANEL: CPT | Performed by: FAMILY MEDICINE

## 2018-05-22 PROCEDURE — 84443 ASSAY THYROID STIM HORMONE: CPT | Performed by: FAMILY MEDICINE

## 2018-05-22 PROCEDURE — 84439 ASSAY OF FREE THYROXINE: CPT | Performed by: FAMILY MEDICINE

## 2018-05-22 PROCEDURE — 80061 LIPID PANEL: CPT | Performed by: FAMILY MEDICINE

## 2018-05-22 PROCEDURE — 36415 COLL VENOUS BLD VENIPUNCTURE: CPT | Performed by: FAMILY MEDICINE

## 2018-05-22 PROCEDURE — 83036 HEMOGLOBIN GLYCOSYLATED A1C: CPT | Performed by: FAMILY MEDICINE

## 2018-05-22 PROCEDURE — 85025 COMPLETE CBC W/AUTO DIFF WBC: CPT | Performed by: FAMILY MEDICINE

## 2018-05-29 ENCOUNTER — OFFICE VISIT (OUTPATIENT)
Dept: FAMILY MEDICINE CLINIC | Facility: CLINIC | Age: 58
End: 2018-05-29
Payer: MEDICARE

## 2018-05-29 VITALS
OXYGEN SATURATION: 86 % | WEIGHT: 213.56 LBS | HEART RATE: 87 BPM | HEIGHT: 65 IN | RESPIRATION RATE: 16 BRPM | SYSTOLIC BLOOD PRESSURE: 118 MMHG | BODY MASS INDEX: 35.58 KG/M2 | TEMPERATURE: 99.2 F | DIASTOLIC BLOOD PRESSURE: 80 MMHG

## 2018-05-29 DIAGNOSIS — M79.643 INTERMITTENT PAIN AND SWELLING OF HAND: ICD-10-CM

## 2018-05-29 DIAGNOSIS — M79.89 INTERMITTENT PAIN AND SWELLING OF HAND: ICD-10-CM

## 2018-05-29 DIAGNOSIS — M19.042 PRIMARY OSTEOARTHRITIS OF BOTH HANDS: ICD-10-CM

## 2018-05-29 DIAGNOSIS — F41.9 ANXIETY: ICD-10-CM

## 2018-05-29 DIAGNOSIS — M19.90 ARTHRITIS: ICD-10-CM

## 2018-05-29 DIAGNOSIS — I10 ESSENTIAL HYPERTENSION: ICD-10-CM

## 2018-05-29 DIAGNOSIS — M15.9 PRIMARY OSTEOARTHRITIS INVOLVING MULTIPLE JOINTS: ICD-10-CM

## 2018-05-29 DIAGNOSIS — E11.9 TYPE 2 DIABETES MELLITUS WITHOUT COMPLICATION, WITHOUT LONG-TERM CURRENT USE OF INSULIN (HCC): Primary | ICD-10-CM

## 2018-05-29 DIAGNOSIS — M50.30 DEGENERATIVE DISC DISEASE, CERVICAL: ICD-10-CM

## 2018-05-29 DIAGNOSIS — E55.9 HYPOVITAMINOSIS D: ICD-10-CM

## 2018-05-29 DIAGNOSIS — M19.041 PRIMARY OSTEOARTHRITIS OF BOTH HANDS: ICD-10-CM

## 2018-05-29 PROBLEM — R79.89 ELEVATED TSH: Status: ACTIVE | Noted: 2017-08-10

## 2018-05-29 PROBLEM — E78.2 MIXED HYPERLIPIDEMIA: Status: ACTIVE | Noted: 2017-03-30

## 2018-05-29 PROCEDURE — 99214 OFFICE O/P EST MOD 30 MIN: CPT | Performed by: FAMILY MEDICINE

## 2018-05-29 RX ORDER — CYCLOBENZAPRINE HCL 10 MG
10 TABLET ORAL 2 TIMES DAILY PRN
Qty: 60 TABLET | Refills: 2 | Status: SHIPPED | OUTPATIENT
Start: 2018-05-29 | End: 2019-03-06 | Stop reason: SDUPTHER

## 2018-05-29 RX ORDER — ALPRAZOLAM 1 MG/1
TABLET ORAL
Qty: 60 TABLET | Refills: 0 | Status: SHIPPED | OUTPATIENT
Start: 2018-05-29 | End: 2018-07-02 | Stop reason: SDUPTHER

## 2018-05-29 NOTE — PROGRESS NOTES
Assessment/Plan:    Patient is seen for multiple medical conditions  Type 2 diabetes mellitus ([address] )  On no meds  Recent HGBa1C was 6 5 - will try increasing exercise    Mixed hyperlipidemia    Her cholesterol was 231 and   Her triglycerides were elevated 203 but patient says she was not fasting for her blood work  Essential hypertension    Blood pressure is controlled on losartan and atenolol  We will continue present dosages  Patient is also with swelling and pain in both her hands  She is asking if she could go for medical massage therapy instead of the active physical therapy she has been having  I did give her a new script  Diagnoses and all orders for this visit:    Type 2 diabetes mellitus without complication, without long-term current use of insulin (HCC)    Anxiety  -     ALPRAZolam (XANAX) 1 mg tablet; One tab every six to eight hours as needed    Arthritis  -     cyclobenzaprine (FLEXERIL) 10 mg tablet; Take 1 tablet (10 mg total) by mouth 2 (two) times a day as needed (arthritis)    Essential hypertension    Hypovitaminosis D    Primary osteoarthritis involving multiple joints    Degenerative disc disease, cervical  -     Ambulatory referral to Physical Therapy; Future    Primary osteoarthritis of both hands  -     Ambulatory referral to Physical Therapy; Future    Intermittent pain and swelling of hand  -     XR hand 3+ vw left; Future  -     XR hand 3+ vw right; Future          Subjective:   Chief Complaint   Patient presents with    Follow-up     2m check to chronic conditions and discuss blood work from 5/22/18    Discuss Pain     B/L thumbs, shoulder and neck x couple months        Patient ID: Candace Hodge is a 62 y o  female  Patient is here for follow up on fasting blood work  She did not relalize she should be fasting  She also complains of hand and foot pain  She  Has swelling in both hands  She just takes it easy when it is really bothering her    She does not feel benefit from her present physical therapy  The following portions of the patient's history were reviewed and updated as appropriate: allergies, current medications, past family history, past medical history, past social history, past surgical history and problem list     Review of Systems   Constitutional: Positive for fatigue  Respiratory: Negative for shortness of breath  Cardiovascular: Negative for chest pain and palpitations  Gastrointestinal: Negative  Musculoskeletal: Positive for arthralgias, joint swelling, myalgias and neck pain  Neurological: Negative  Psychiatric/Behavioral: Negative  Objective:      /80 (BP Location: Left arm, Patient Position: Sitting, Cuff Size: Adult)   Pulse 87   Temp 99 2 °F (37 3 °C) (Tympanic)   Resp 16   Ht 5' 4 5" (1 638 m)   Wt 96 9 kg (213 lb 9 oz)   SpO2 (!) 86%   Breastfeeding? No   BMI 36 09 kg/m²          Physical Exam   Constitutional: She is oriented to person, place, and time  She appears well-developed  No distress  Neck: No thyromegaly present  Cardiovascular: Normal rate and regular rhythm  Pulmonary/Chest: Effort normal and breath sounds normal    Musculoskeletal: She exhibits edema (bilateral joints)  Lymphadenopathy:     She has no cervical adenopathy  Neurological: She is alert and oriented to person, place, and time  Skin: Skin is warm and dry  Psychiatric: She has a normal mood and affect  Nursing note and vitals reviewed

## 2018-05-31 NOTE — ASSESSMENT & PLAN NOTE
Her cholesterol was 231 and   Her triglycerides were elevated 203 but patient says she was not fasting for her blood work

## 2018-06-20 ENCOUNTER — APPOINTMENT (OUTPATIENT)
Dept: RADIOLOGY | Facility: MEDICAL CENTER | Age: 58
End: 2018-06-20
Payer: MEDICARE

## 2018-06-20 DIAGNOSIS — M79.89 INTERMITTENT PAIN AND SWELLING OF HAND: ICD-10-CM

## 2018-06-20 DIAGNOSIS — M79.643 INTERMITTENT PAIN AND SWELLING OF HAND: ICD-10-CM

## 2018-06-20 PROCEDURE — 73130 X-RAY EXAM OF HAND: CPT

## 2018-07-02 DIAGNOSIS — F41.9 ANXIETY: ICD-10-CM

## 2018-07-02 NOTE — TELEPHONE ENCOUNTER
From: Samir Lozano  Sent: 7/2/2018 5:03 PM EDT  Subject: Medication Renewal Request    Samir Lozano would like a refill of the following medications:     ALPRAZolam Vernelle Inch) 1 mg tablet Onnoreen Crump DO]    Preferred pharmacy: [id number]

## 2018-07-03 RX ORDER — ALPRAZOLAM 1 MG/1
TABLET ORAL
Qty: 60 TABLET | Refills: 0 | Status: SHIPPED | OUTPATIENT
Start: 2018-07-03 | End: 2018-08-06 | Stop reason: SDUPTHER

## 2018-07-11 ENCOUNTER — OFFICE VISIT (OUTPATIENT)
Dept: FAMILY MEDICINE CLINIC | Facility: CLINIC | Age: 58
End: 2018-07-11
Payer: MEDICARE

## 2018-07-11 VITALS
TEMPERATURE: 98.8 F | SYSTOLIC BLOOD PRESSURE: 134 MMHG | WEIGHT: 221.4 LBS | DIASTOLIC BLOOD PRESSURE: 72 MMHG | HEART RATE: 90 BPM | BODY MASS INDEX: 37.8 KG/M2 | HEIGHT: 64 IN | OXYGEN SATURATION: 96 %

## 2018-07-11 DIAGNOSIS — Z23 NEED FOR SHINGLES VACCINE: ICD-10-CM

## 2018-07-11 DIAGNOSIS — M19.042 PRIMARY OSTEOARTHRITIS OF LEFT HAND: Primary | ICD-10-CM

## 2018-07-11 DIAGNOSIS — E11.9 TYPE 2 DIABETES MELLITUS WITHOUT COMPLICATION, WITHOUT LONG-TERM CURRENT USE OF INSULIN (HCC): ICD-10-CM

## 2018-07-11 DIAGNOSIS — I10 ESSENTIAL HYPERTENSION: ICD-10-CM

## 2018-07-11 PROBLEM — E03.9 HYPOTHYROIDISM: Status: ACTIVE | Noted: 2017-11-02

## 2018-07-11 PROBLEM — K58.0 IRRITABLE BOWEL SYNDROME WITH DIARRHEA: Status: ACTIVE | Noted: 2017-05-02

## 2018-07-11 PROCEDURE — 99213 OFFICE O/P EST LOW 20 MIN: CPT | Performed by: FAMILY MEDICINE

## 2018-07-11 RX ORDER — ATENOLOL 50 MG/1
50 TABLET ORAL DAILY
Qty: 90 TABLET | Refills: 1 | Status: SHIPPED | OUTPATIENT
Start: 2018-07-11 | End: 2018-10-17 | Stop reason: SDUPTHER

## 2018-07-11 NOTE — PROGRESS NOTES
Assessment/Plan:    Patient is a 42-year-old female seen for follow-up of bilateral hand pain  I had ordered x-rays at her last visit and it did show arthritis in her left hand  The x-ray of her right hand was negative  I recommended that she see Orthopedics regarding her hand and possibly an injection would help but she prefers to hold off on that for now  She will try physical therapy for hand pain  She had contacted physical therapy here in because she regarding medical massage therapy, but they do not do this  She has seen Dr Raad Kohli at [facility] - for medical marijuana  We also discussed the shingles vaccine  She will see me for her diabetes and thyroid in October  Diagnoses and all orders for this visit:    Primary osteoarthritis of left hand    Essential hypertension  -     atenolol (TENORMIN) 50 mg tablet; Take 1 tablet (50 mg total) by mouth daily    Need for shingles vaccine  -     Zoster Vac Recomb Adjuvanted (SHINGRIX) 50 MCG SUSR; Inject 0 5 mL into a muscle once for 1 dose Repeat in two to six months    Type 2 diabetes mellitus without complication, without long-term current use of insulin (HCC)  -     Hemoglobin A1C  -     Comprehensive metabolic panel  -     Lipid Panel with Direct LDL reflex  -     TSH, 3rd generation with Free T4 reflex          Subjective:   Chief Complaint   Patient presents with    Follow-up     6-8 weeks, pt had prior Xray done on hands  Patient ID: Jayesh Posey is a 62 y o  female  HPI    The following portions of the patient's history were reviewed and updated as appropriate: allergies, current medications, past family history, past medical history, past social history, past surgical history and problem list     Review of Systems   Constitutional: Negative for chills and fever  HENT: Negative for congestion and sore throat  Respiratory: Negative for chest tightness  Cardiovascular: Negative for chest pain and palpitations  Gastrointestinal: Negative for abdominal pain, constipation, diarrhea and nausea  Genitourinary: Negative for difficulty urinating  Musculoskeletal: Positive for arthralgias  Skin: Negative  Neurological: Negative for dizziness and headaches  Psychiatric/Behavioral: Negative  Objective:      /72 (BP Location: Left arm, Patient Position: Sitting, Cuff Size: Large)   Pulse 90   Temp 98 8 °F (37 1 °C)   Ht 5' 4" (1 626 m)   Wt 100 kg (221 lb 6 4 oz)   SpO2 96%   Breastfeeding? No   BMI 38 00 kg/m²          Physical Exam   Constitutional: She is oriented to person, place, and time  She appears well-developed  No distress  Neck: Carotid bruit is not present  Cardiovascular: Normal rate, regular rhythm and normal heart sounds  Pulmonary/Chest: Effort normal and breath sounds normal    Musculoskeletal: She exhibits tenderness (  Tenderness over left metacarpal carpal joint  )  She exhibits no edema  Neurological: She is alert and oriented to person, place, and time  Skin: Skin is warm and dry  Psychiatric: She has a normal mood and affect  Nursing note and vitals reviewed

## 2018-08-06 DIAGNOSIS — F41.9 ANXIETY: ICD-10-CM

## 2018-08-06 RX ORDER — ALPRAZOLAM 1 MG/1
TABLET ORAL
Qty: 60 TABLET | Refills: 0 | Status: SHIPPED | OUTPATIENT
Start: 2018-08-06 | End: 2018-08-30 | Stop reason: SDUPTHER

## 2018-08-08 DIAGNOSIS — E03.9 ACQUIRED HYPOTHYROIDISM: ICD-10-CM

## 2018-08-08 RX ORDER — LEVOTHYROXINE SODIUM 0.07 MG/1
TABLET ORAL
Qty: 90 TABLET | Refills: 1 | Status: SHIPPED | OUTPATIENT
Start: 2018-08-08 | End: 2019-02-05 | Stop reason: SDUPTHER

## 2018-08-30 DIAGNOSIS — F41.9 ANXIETY: ICD-10-CM

## 2018-08-30 RX ORDER — ALPRAZOLAM 1 MG/1
TABLET ORAL
Qty: 60 TABLET | Refills: 0 | Status: SHIPPED | OUTPATIENT
Start: 2018-08-30 | End: 2018-10-01 | Stop reason: SDUPTHER

## 2018-08-30 NOTE — TELEPHONE ENCOUNTER
Last med check 05/29/2018 - Next appt 10/11/2018    Per PDMP last filled 08/06/2018 for 15 day supply

## 2018-08-30 NOTE — TELEPHONE ENCOUNTER
From: Vanessa Lozano  Sent: 8/30/2018 10:51 AM EDT  Subject: Medication Renewal Request    Vanessa Lozano would like a refill of the following medications:     ALPRAZolam (XANAX) 1 mg tablet Angely Cmal, DO]   Patient Comment: Please refill at [address] in [address]    Preferred pharmacy: [phone]

## 2018-10-01 DIAGNOSIS — F41.9 ANXIETY: ICD-10-CM

## 2018-10-01 RX ORDER — ALPRAZOLAM 1 MG/1
TABLET ORAL
Qty: 120 TABLET | Refills: 0 | Status: SHIPPED | OUTPATIENT
Start: 2018-10-01 | End: 2018-11-08 | Stop reason: SDUPTHER

## 2018-10-01 NOTE — TELEPHONE ENCOUNTER
Last appt 07/11/18 - Next appt 10/11/18    Per PDMP last filled 08/31/18 for 15 days  Kevan'd up for a 30 day supply

## 2018-10-01 NOTE — TELEPHONE ENCOUNTER
From: Svitlana Lozano  Sent: 10/1/2018 10:18 AM EDT  Subject: Medication Renewal Request    Svitlana Lozano would like a refill of the following medications:     ALPRAZolam Cecily Pathak) 1 mg tablet JOSE Sanders    Preferred pharmacy: [id number]

## 2018-10-04 ENCOUNTER — CLINICAL SUPPORT (OUTPATIENT)
Dept: FAMILY MEDICINE CLINIC | Facility: CLINIC | Age: 58
End: 2018-10-04
Payer: MEDICARE

## 2018-10-04 DIAGNOSIS — E55.9 VITAMIN D DEFICIENCY: ICD-10-CM

## 2018-10-04 DIAGNOSIS — I10 ESSENTIAL HYPERTENSION: ICD-10-CM

## 2018-10-04 DIAGNOSIS — E11.9 TYPE 2 DIABETES MELLITUS WITHOUT COMPLICATION, WITHOUT LONG-TERM CURRENT USE OF INSULIN (HCC): ICD-10-CM

## 2018-10-04 DIAGNOSIS — E03.9 HYPOTHYROIDISM, UNSPECIFIED TYPE: ICD-10-CM

## 2018-10-04 LAB
ALBUMIN SERPL BCP-MCNC: 4 G/DL (ref 3.5–5)
ALP SERPL-CCNC: 64 U/L (ref 46–116)
ALT SERPL W P-5'-P-CCNC: 53 U/L (ref 12–78)
ANION GAP SERPL CALCULATED.3IONS-SCNC: 8 MMOL/L (ref 4–13)
AST SERPL W P-5'-P-CCNC: 29 U/L (ref 5–45)
BILIRUB SERPL-MCNC: 0.54 MG/DL (ref 0.2–1)
BUN SERPL-MCNC: 19 MG/DL (ref 5–25)
CALCIUM SERPL-MCNC: 8.7 MG/DL (ref 8.3–10.1)
CHLORIDE SERPL-SCNC: 103 MMOL/L (ref 100–108)
CHOLEST SERPL-MCNC: 211 MG/DL (ref 50–200)
CO2 SERPL-SCNC: 26 MMOL/L (ref 21–32)
CREAT SERPL-MCNC: 0.72 MG/DL (ref 0.6–1.3)
EST. AVERAGE GLUCOSE BLD GHB EST-MCNC: 146 MG/DL
GFR SERPL CREATININE-BSD FRML MDRD: 93 ML/MIN/1.73SQ M
GLUCOSE SERPL-MCNC: 136 MG/DL (ref 65–140)
HBA1C MFR BLD: 6.7 % (ref 4.2–6.3)
HDLC SERPL-MCNC: 42 MG/DL (ref 40–60)
LDLC SERPL CALC-MCNC: 141 MG/DL (ref 0–100)
POTASSIUM SERPL-SCNC: 4.3 MMOL/L (ref 3.5–5.3)
PROT SERPL-MCNC: 7.3 G/DL (ref 6.4–8.2)
SODIUM SERPL-SCNC: 137 MMOL/L (ref 136–145)
TRIGL SERPL-MCNC: 140 MG/DL
TSH SERPL DL<=0.05 MIU/L-ACNC: 1.32 UIU/ML (ref 0.36–3.74)

## 2018-10-04 PROCEDURE — 80053 COMPREHEN METABOLIC PANEL: CPT | Performed by: FAMILY MEDICINE

## 2018-10-04 PROCEDURE — 84443 ASSAY THYROID STIM HORMONE: CPT | Performed by: FAMILY MEDICINE

## 2018-10-04 PROCEDURE — 36415 COLL VENOUS BLD VENIPUNCTURE: CPT | Performed by: FAMILY MEDICINE

## 2018-10-04 PROCEDURE — 80061 LIPID PANEL: CPT | Performed by: FAMILY MEDICINE

## 2018-10-04 PROCEDURE — 83036 HEMOGLOBIN GLYCOSYLATED A1C: CPT | Performed by: FAMILY MEDICINE

## 2018-10-17 ENCOUNTER — OFFICE VISIT (OUTPATIENT)
Dept: FAMILY MEDICINE CLINIC | Facility: CLINIC | Age: 58
End: 2018-10-17
Payer: MEDICARE

## 2018-10-17 VITALS
OXYGEN SATURATION: 93 % | TEMPERATURE: 99.1 F | HEIGHT: 65 IN | DIASTOLIC BLOOD PRESSURE: 88 MMHG | BODY MASS INDEX: 38.15 KG/M2 | HEART RATE: 100 BPM | SYSTOLIC BLOOD PRESSURE: 124 MMHG | WEIGHT: 229 LBS | RESPIRATION RATE: 17 BRPM

## 2018-10-17 DIAGNOSIS — I10 ESSENTIAL HYPERTENSION: ICD-10-CM

## 2018-10-17 DIAGNOSIS — E78.2 MIXED HYPERLIPIDEMIA: ICD-10-CM

## 2018-10-17 DIAGNOSIS — F41.9 ANXIETY: ICD-10-CM

## 2018-10-17 DIAGNOSIS — Z23 NEED FOR INFLUENZA VACCINATION: ICD-10-CM

## 2018-10-17 DIAGNOSIS — Z12.39 BREAST CANCER SCREENING: ICD-10-CM

## 2018-10-17 DIAGNOSIS — E11.9 TYPE 2 DIABETES MELLITUS WITHOUT COMPLICATION, WITHOUT LONG-TERM CURRENT USE OF INSULIN (HCC): Primary | ICD-10-CM

## 2018-10-17 DIAGNOSIS — G89.4 CHRONIC PAIN SYNDROME: ICD-10-CM

## 2018-10-17 LAB
LEFT EYE DIABETIC RETINOPATHY: NORMAL
LEFT EYE DIABETIC RETINOPATHY: NORMAL
LEFT EYE IMAGE QUALITY: NORMAL
RIGHT EYE DIABETIC RETINOPATHY: NORMAL
RIGHT EYE DIABETIC RETINOPATHY: NORMAL
RIGHT EYE IMAGE QUALITY: NORMAL
SEVERITY (EYE EXAM): NORMAL

## 2018-10-17 PROCEDURE — 90662 IIV NO PRSV INCREASED AG IM: CPT | Performed by: FAMILY MEDICINE

## 2018-10-17 PROCEDURE — 92250 FUNDUS PHOTOGRAPHY W/I&R: CPT | Performed by: FAMILY MEDICINE

## 2018-10-17 PROCEDURE — 99214 OFFICE O/P EST MOD 30 MIN: CPT | Performed by: FAMILY MEDICINE

## 2018-10-17 PROCEDURE — G0008 ADMIN INFLUENZA VIRUS VAC: HCPCS | Performed by: FAMILY MEDICINE

## 2018-10-17 RX ORDER — LIDOCAINE 50 MG/G
1 PATCH TOPICAL DAILY
Qty: 30 PATCH | Refills: 0 | Status: SHIPPED | OUTPATIENT
Start: 2018-10-17 | End: 2018-11-20 | Stop reason: CLARIF

## 2018-10-17 RX ORDER — BUSPIRONE HYDROCHLORIDE 5 MG/1
5 TABLET ORAL 2 TIMES DAILY
Qty: 60 TABLET | Refills: 1 | Status: SHIPPED | OUTPATIENT
Start: 2018-10-17 | End: 2018-11-20 | Stop reason: DRUGHIGH

## 2018-10-17 RX ORDER — SIMVASTATIN 20 MG
20 TABLET ORAL DAILY
Qty: 90 TABLET | Refills: 1 | Status: SHIPPED | OUTPATIENT
Start: 2018-10-17 | End: 2019-03-06 | Stop reason: SDUPTHER

## 2018-10-17 RX ORDER — ATENOLOL 50 MG/1
50 TABLET ORAL DAILY
Qty: 90 TABLET | Refills: 1 | Status: SHIPPED | OUTPATIENT
Start: 2018-10-17 | End: 2019-03-06 | Stop reason: SDUPTHER

## 2018-10-17 RX ORDER — LOSARTAN POTASSIUM 100 MG/1
100 TABLET ORAL DAILY
Qty: 90 TABLET | Refills: 1 | Status: SHIPPED | OUTPATIENT
Start: 2018-10-17 | End: 2019-03-06 | Stop reason: SDUPTHER

## 2018-10-17 RX ORDER — SERTRALINE HYDROCHLORIDE 100 MG/1
100 TABLET, FILM COATED ORAL DAILY
Qty: 90 TABLET | Refills: 1 | Status: SHIPPED | OUTPATIENT
Start: 2018-10-17 | End: 2018-11-20 | Stop reason: SDUPTHER

## 2018-10-17 NOTE — PROGRESS NOTES
Assessment/Plan:    Patient is a 79-year-old female seen for type 2 diabetes and hypothyroidism  She is also on medication for anxiety and depression  She is still dealing with her 's death  She is also concerned about using Vicodin occasionally with all the information in the news regarding opioid dependence  Diagnoses and all orders for this visit:    Type 2 diabetes mellitus without complication, without long-term current use of insulin ([facility])  -     glucose blood (DOUGIE CONTOUR TEST) test strip; Test blood sugar once daily  E11 9    Mixed hyperlipidemia  -     simvastatin (ZOCOR) 20 mg tablet; Take 1 tablet (20 mg total) by mouth daily    Essential hypertension  -     losartan (COZAAR) 100 MG tablet; Take 1 tablet (100 mg total) by mouth daily  -     atenolol (TENORMIN) 50 mg tablet; Take 1 tablet (50 mg total) by mouth daily    Anxiety  -     sertraline (ZOLOFT) 100 mg tablet; Take 1 tablet (100 mg total) by mouth daily  -     busPIRone (BUSPAR) 5 mg tablet; Take 1 tablet (5 mg total) by mouth 2 (two) times a day    Need for influenza vaccination  -     influenza vaccine, [id number], high-dose, PF 0 5 mL, for patients 65 yr+ (FLUZONE HIGH-DOSE)    Breast cancer screening  -     Mammo screening bilateral w 3d & cad; Future    Chronic pain syndrome  -     lidocaine (LIDODERM) 5 %; Apply 1 patch topically daily Remove & Discard patch within 12 hours or as directed by MD          Subjective:   Chief Complaint   Patient presents with    Follow-up     pt here to review her labs        Patient ID: Teri Bean is a 62 y o  female  Patient is here for follow up on diabetes  She has found that she is lactose intolerant  She is having a lot of pain in back - not relieved with anti- inflammatory, tylenol  Takes Vicoden occasionally          The following portions of the patient's history were reviewed and updated as appropriate: allergies, current medications, past family history, past medical history, past social history, past surgical history and problem list     Review of Systems   Constitutional: Negative for chills and fever  HENT: Negative for congestion and sore throat  Respiratory: Negative for chest tightness  Cardiovascular: Negative for chest pain and palpitations  Gastrointestinal: Negative for abdominal pain, constipation, diarrhea and nausea  Genitourinary: Negative for difficulty urinating  Musculoskeletal: Positive for arthralgias, gait problem and myalgias  Skin: Negative  Neurological: Negative for dizziness and headaches  Psychiatric/Behavioral: The patient is nervous/anxious  Objective:      /88 (BP Location: Right arm, Patient Position: Sitting, Cuff Size: Adult)   Pulse 100   Temp 99 1 °F (37 3 °C) (Tympanic)   Resp 17   Ht 5' 4 5" (1 638 m)   Wt 104 kg (229 lb)   SpO2 93%   BMI 38 70 kg/m²          Physical Exam   Constitutional: She is oriented to person, place, and time  She appears well-developed  No distress  Neck: Carotid bruit is not present  No thyromegaly present  Cardiovascular: Normal rate, regular rhythm and normal heart sounds  Pulmonary/Chest: Effort normal and breath sounds normal    Musculoskeletal: She exhibits no edema  Lymphadenopathy:     She has no cervical adenopathy  Neurological: She is alert and oriented to person, place, and time  Skin: Skin is warm and dry  Psychiatric: She has a normal mood and affect  Nursing note and vitals reviewed

## 2018-10-19 ENCOUNTER — DOCUMENTATION (OUTPATIENT)
Dept: FAMILY MEDICINE CLINIC | Facility: CLINIC | Age: 58
End: 2018-10-19

## 2018-11-02 DIAGNOSIS — E11.9 TYPE 2 DIABETES MELLITUS WITHOUT COMPLICATION, WITHOUT LONG-TERM CURRENT USE OF INSULIN (HCC): Primary | ICD-10-CM

## 2018-11-08 DIAGNOSIS — F41.9 ANXIETY: ICD-10-CM

## 2018-11-09 NOTE — TELEPHONE ENCOUNTER
From: Elida Lozano  Sent: 11/8/2018 12:48 PM EST  Subject: Medication Renewal Request    Jesus Lozano would like a refill of the following medications:     ALPRAZolam [place] [facility]) 1 mg tablet JOSE Araujo    Preferred pharmacy: [id number]

## 2018-11-10 RX ORDER — ALPRAZOLAM 1 MG/1
TABLET ORAL
Qty: 120 TABLET | Refills: 0 | Status: SHIPPED | OUTPATIENT
Start: 2018-11-10 | End: 2019-01-09 | Stop reason: SDUPTHER

## 2018-11-20 ENCOUNTER — OFFICE VISIT (OUTPATIENT)
Dept: FAMILY MEDICINE CLINIC | Facility: CLINIC | Age: 58
End: 2018-11-20
Payer: MEDICARE

## 2018-11-20 VITALS
BODY MASS INDEX: 37.57 KG/M2 | OXYGEN SATURATION: 98 % | WEIGHT: 222.3 LBS | DIASTOLIC BLOOD PRESSURE: 88 MMHG | TEMPERATURE: 99.1 F | SYSTOLIC BLOOD PRESSURE: 128 MMHG | HEART RATE: 107 BPM

## 2018-11-20 DIAGNOSIS — E11.9 TYPE 2 DIABETES MELLITUS WITHOUT COMPLICATION, WITHOUT LONG-TERM CURRENT USE OF INSULIN (HCC): Primary | ICD-10-CM

## 2018-11-20 DIAGNOSIS — F41.9 ANXIETY: ICD-10-CM

## 2018-11-20 PROCEDURE — 99213 OFFICE O/P EST LOW 20 MIN: CPT | Performed by: FAMILY MEDICINE

## 2018-11-20 RX ORDER — SERTRALINE HYDROCHLORIDE 100 MG/1
50 TABLET, FILM COATED ORAL DAILY
Qty: 90 TABLET | Refills: 0
Start: 2018-11-20 | End: 2019-02-22 | Stop reason: ALTCHOICE

## 2018-11-20 RX ORDER — BUSPIRONE HYDROCHLORIDE 5 MG/1
5 TABLET ORAL 3 TIMES DAILY
Qty: 90 TABLET | Refills: 1 | Status: SHIPPED | OUTPATIENT
Start: 2018-11-20 | End: 2018-11-26 | Stop reason: SDUPTHER

## 2018-11-20 NOTE — PROGRESS NOTES
Assessment/Plan:  Since she has been improving on Buspar, I am going to Increase Buspar to three times a day  I did discuss that there may be a problem with availablility  Will recheck after the firs of the year  Increase exercise to decrease blood sugar  Discussed eye exam - no diabetic retinopathy  Diagnoses and all orders for this visit:    Type 2 diabetes mellitus without complication, without long-term current use of insulin ([facility])    Anxiety  -     busPIRone (BUSPAR) 5 mg tablet; Take 1 tablet (5 mg total) by mouth 3 (three) times a day  -     sertraline (ZOLOFT) 100 mg tablet; Take 0 5 tablets (50 mg total) by mouth daily          Subjective:   Chief Complaint   Patient presents with    Follow-up     one month med check, started on Buspar         Patient ID: Ankush Coto is a 62 y o  female  Patient is here to follow up on adding Buspar  Her panic attacks have improved, her daughter says she hasn't been as good for some time  She is monitoring her sugars and they are a little high  The following portions of the patient's history were reviewed and updated as appropriate: allergies, current medications, past family history, past medical history, past social history, past surgical history and problem list     Review of Systems   Constitutional: Negative for chills and fever  HENT: Negative for congestion and sore throat  Respiratory: Negative for chest tightness  Cardiovascular: Negative for chest pain and palpitations  Gastrointestinal: Negative for abdominal pain, constipation, diarrhea and nausea  Genitourinary: Negative for difficulty urinating  Skin: Negative  Neurological: Negative for dizziness and headaches  Psychiatric/Behavioral: The patient is nervous/anxious (improving)            Objective:      /88 (BP Location: Left arm, Patient Position: Sitting)   Pulse (!) 107   Temp 99 1 °F (37 3 °C) (Tympanic)   Wt 101 kg (222 lb 4 8 oz)   SpO2 98%   BMI 37 57 kg/m²          Physical Exam   Constitutional: She is oriented to person, place, and time  She appears well-developed  No distress  Neck: No thyromegaly present  Cardiovascular: Normal rate and regular rhythm  Pulmonary/Chest: Effort normal and breath sounds normal    Lymphadenopathy:     She has no cervical adenopathy  Neurological: She is alert and oriented to person, place, and time  Skin: Skin is warm and dry  Psychiatric:   Patient appears much calmer  Nursing note and vitals reviewed

## 2018-11-26 DIAGNOSIS — F41.9 ANXIETY: ICD-10-CM

## 2018-11-26 NOTE — TELEPHONE ENCOUNTER
From: Lanette Lozano  Sent: 11/26/2018 11:18 AM EST  Subject: Medication Renewal Request    Jesus Lozano would like a refill of the following medications:     busPIRone (BUSPAR) 5 mg tablet JOSE Liu    Preferred pharmacy: [place] [phone]

## 2018-11-27 RX ORDER — BUSPIRONE HYDROCHLORIDE 5 MG/1
5 TABLET ORAL 3 TIMES DAILY
Qty: 90 TABLET | Refills: 1 | Status: SHIPPED | OUTPATIENT
Start: 2018-11-27 | End: 2019-01-02 | Stop reason: SDUPTHER

## 2019-01-02 ENCOUNTER — OFFICE VISIT (OUTPATIENT)
Dept: FAMILY MEDICINE CLINIC | Facility: CLINIC | Age: 59
End: 2019-01-02
Payer: MEDICARE

## 2019-01-02 VITALS
BODY MASS INDEX: 39.78 KG/M2 | OXYGEN SATURATION: 96 % | TEMPERATURE: 98.4 F | WEIGHT: 235.4 LBS | DIASTOLIC BLOOD PRESSURE: 80 MMHG | SYSTOLIC BLOOD PRESSURE: 118 MMHG | HEART RATE: 99 BPM

## 2019-01-02 DIAGNOSIS — I10 ESSENTIAL HYPERTENSION: ICD-10-CM

## 2019-01-02 DIAGNOSIS — F41.9 ANXIETY: ICD-10-CM

## 2019-01-02 DIAGNOSIS — E03.9 HYPOTHYROIDISM, UNSPECIFIED TYPE: ICD-10-CM

## 2019-01-02 DIAGNOSIS — M25.50 MULTIPLE JOINT PAIN: Primary | ICD-10-CM

## 2019-01-02 DIAGNOSIS — E11.9 TYPE 2 DIABETES MELLITUS WITHOUT COMPLICATION, WITHOUT LONG-TERM CURRENT USE OF INSULIN (HCC): ICD-10-CM

## 2019-01-02 DIAGNOSIS — E78.1 HYPERTRIGLYCERIDEMIA: ICD-10-CM

## 2019-01-02 PROCEDURE — 99214 OFFICE O/P EST MOD 30 MIN: CPT | Performed by: FAMILY MEDICINE

## 2019-01-02 RX ORDER — OXYCODONE HYDROCHLORIDE AND ACETAMINOPHEN 5; 325 MG/1; MG/1
1 TABLET ORAL EVERY 6 HOURS PRN
Qty: 30 TABLET | Refills: 0 | Status: SHIPPED | OUTPATIENT
Start: 2019-01-02

## 2019-01-02 RX ORDER — DICYCLOMINE HYDROCHLORIDE 10 MG/1
10 CAPSULE ORAL AS NEEDED
COMMUNITY

## 2019-01-02 RX ORDER — BUSPIRONE HYDROCHLORIDE 10 MG/1
10 TABLET ORAL 3 TIMES DAILY
Qty: 90 TABLET
Start: 2019-01-02 | End: 2019-01-31 | Stop reason: SDUPTHER

## 2019-01-02 NOTE — PROGRESS NOTES
Assessment/Plan:  Patient is a 51-year-old female seen for follow-up of anxiety depression  She also complains multiple joint pains which is a combination of her osteoarthritis and fibromyalgia  Anxiety-Depression -Patient has noticed a difference on Buspar 10 mg TID, can try to totally stop Sertraline -slow taper  Osteoarthritis/Fibromyalgia - NSAID's and muscle relaxers do not help I am referring her to physical therapy in hopes that they can give her exercises to help strengthen her muscles and so she can with better support her joints  I also told her that she could look at PT for a place to go for exercise  I was not sure what it costs monthly  I did prescribe a small amount of Percocet just to use when pain with is severe  I will see her back in February for follow-up of her diabetes and thyroid  She will have fasting blood work prior  No problem-specific Assessment & Plan notes found for this encounter  Patient did give me hydrocodone that was dispensed in 2017 to dispose of  Diagnoses and all orders for this visit:    Multiple joint pain  -     Ambulatory referral to Physical Therapy; Future  -     oxyCODONE-acetaminophen (PERCOCET) 5-325 mg per tablet; Take 1 tablet by mouth every 6 (six) hours as needed for moderate pain Max Daily Amount: 4 tablets  -     CBC and differential; Future    Anxiety  -     busPIRone (BUSPAR) 10 mg tablet; Take 1 tablet (10 mg total) by mouth 3 (three) times a day    Type 2 diabetes mellitus without complication, without long-term current use of insulin ([facility])  -     CBC and differential; Future  -     Comprehensive metabolic panel; Future  -     Hemoglobin A1C; Future    Essential hypertension  -     CBC and differential; Future  -     Comprehensive metabolic panel; Future    Hypertriglyceridemia  -     CBC and differential; Future  -     TSH, 3rd generation with Free T4 reflex; Future  -     Comprehensive metabolic panel;  Future  -     Lipid Panel with Direct LDL reflex; Future    Hypothyroidism, unspecified type  -     CBC and differential; Future  -     TSH, 3rd generation with Free T4 reflex; Future  -     Lipid Panel with Direct LDL reflex; Future    Other orders  -     dicyclomine (BENTYL) 10 mg capsule; Take 10 mg by mouth 4 (four) times a day (before meals and at bedtime)          Subjective:   Chief Complaint   Patient presents with    Follow-up     6 weeks        Patient ID: Naresh Brar is a 62 y o  female  Patient is here for follow up on anxiety - is doing better  She is going out on her own  BC/BS has cut out [employer]  But she is having a lot of joint pain  She had been on Cymbalta in the past         The following portions of the patient's history were reviewed and updated as appropriate: allergies, current medications, past family history, past medical history, past social history, past surgical history and problem list     Review of Systems   Constitutional: Negative for chills and fever  HENT: Negative for congestion and sore throat  Respiratory: Negative for chest tightness  Cardiovascular: Negative for chest pain and palpitations  Gastrointestinal: Negative for abdominal pain, constipation, diarrhea and nausea  Genitourinary: Negative for difficulty urinating  Musculoskeletal: Positive for arthralgias, back pain and myalgias  Skin: Negative  Neurological: Negative for dizziness and headaches  Psychiatric/Behavioral: Negative  Objective:      /80 (BP Location: Left arm, Patient Position: Sitting)   Pulse 99   Temp 98 4 °F (36 9 °C) (Tympanic)   Wt 107 kg (235 lb 6 4 oz)   SpO2 96%   BMI 39 78 kg/m²          Physical Exam   Constitutional: She is oriented to person, place, and time  She appears well-developed  No distress  Neck: No thyromegaly present  Cardiovascular: Normal rate and regular rhythm      Pulmonary/Chest: Effort normal and breath sounds normal    Musculoskeletal: She exhibits tenderness (Tenderness of joints, but no erythema or swellling  )  Lymphadenopathy:     She has no cervical adenopathy  Neurological: She is alert and oriented to person, place, and time  Skin: Skin is warm and dry  Psychiatric: She has a normal mood and affect

## 2019-01-05 DIAGNOSIS — I10 ESSENTIAL HYPERTENSION: ICD-10-CM

## 2019-01-05 RX ORDER — ATENOLOL 50 MG/1
50 TABLET ORAL DAILY
Qty: 90 TABLET | Refills: 0 | Status: CANCELLED | OUTPATIENT
Start: 2019-01-05

## 2019-01-09 ENCOUNTER — EVALUATION (OUTPATIENT)
Dept: PHYSICAL THERAPY | Facility: CLINIC | Age: 59
End: 2019-01-09
Payer: MEDICARE

## 2019-01-09 DIAGNOSIS — F41.9 ANXIETY: ICD-10-CM

## 2019-01-09 DIAGNOSIS — M25.50 MULTIPLE JOINT PAIN: Primary | ICD-10-CM

## 2019-01-09 DIAGNOSIS — K21.9 GASTROESOPHAGEAL REFLUX DISEASE, ESOPHAGITIS PRESENCE NOT SPECIFIED: ICD-10-CM

## 2019-01-09 PROCEDURE — 97163 PT EVAL HIGH COMPLEX 45 MIN: CPT

## 2019-01-09 NOTE — PROGRESS NOTES
PT Evaluation     Today's date: 2019  Patient name: Rosemarie Sarmiento  : 1960  MRN: [id number]  Referring provider: Jose Luis Parra DO  Dx:   Encounter Diagnosis     ICD-10-CM    1  Multiple joint pain M25 50 Ambulatory referral to Physical Therapy                  Assessment  Assessment details: Rosemarie Sarmiento is a 62 y o  female presenting to PT with global joint aches and pains, decreased lumbar, cervical, shoulder, and hip range of motion, decreased strength, and decreased tolerance to activity secondary to history of fractures, osteoarthritis, rheumatoid arthritis, and fibromyalgia  Patient would benefit from skilled PT services to address these impairments and to maximize function in order to improve quality of life  Thank you for the referral   Impairments: abnormal gait, abnormal muscle tone, abnormal or restricted ROM, activity intolerance, impaired physical strength, lacks appropriate home exercise program, pain with function, weight-bearing intolerance, poor posture  and poor body mechanics  Functional limitations: difficulty completing household chores and ADLs (including dressing, self-care hygiene, cleaning and cooking), pain with ambulation and stair climbing, difficulty with transfers and sleeping  Symptom irritability: highUnderstanding of Dx/Px/POC: good   Prognosis: good    Goals  STG  1) Cervical and shoulder ROM will improve 50% in 4 weeks  2) Lumbar and hip ROM will improve 50% in 4 weeks  3) B/L hip and shoulder strength will improve 1/2 grade in 4 weeks  4) Patient will be independent in HEP in 4 weeks  LTG  1) B/L hip and shoulder strength will improve to 5/5 in 8 weeks  2) Lumbar and hip ROM will improve to [facility] in 8 weeks  3) Cervical and shoulder ROM will improve to [facility] in 8 weeks  4) Patient will have 50-75% improvement in pain reports with activity in 8 weeks    5) Patient will return to use of home cardio equipment for exercise at least 3x/week within 8 weeks     Plan  Patient would benefit from: skilled physical therapy  Planned therapy interventions: abdominal trunk stabilization, home exercise program, flexibility, functional ROM exercises, therapeutic exercise, therapeutic activities, stretching, strengthening, patient education, neuromuscular re-education, manual therapy, activity modification, balance/weight bearing training, behavior modification, body mechanics training, breathing training, postural training, muscle pump exercises, massage and joint mobilization  Frequency: 2x week  Duration in weeks: 8  Treatment plan discussed with: patient        Subjective Evaluation    History of Present Illness  Mechanism of injury: Patient presents with reports of R sided neck pain (x-rays have shown DJD)  She also has pain in her hands (severe arthritis in both hands and wrists)  She also has been having muscle spasms in both sides of her neck, as well as on the R side of her torso  She states she is recently  as of 6 months ago, and she spent all of her time taking care of her  while he was ill, and spent no time taking care of herself  She states that since he has passed, she spends a lot of time in bed because she is always tired, however she cannot sleep due to the pain  One week ago she started a very strict diet (oatmeal for breakfast, chicken breast and salad for other meals)  She was given Percocet script last week, and has taken two, which alleviated the pain for a little, but she does not want to rely on it    Quality of life: fair    Pain  Current pain ratin  At best pain ratin  At worst pain ratin  Quality: cramping, discomfort, dull ache, sharp and tight  Relieving factors: heat, change in position and medications  Aggravating factors: walking, standing, lifting, sitting and stair climbing (standing still (doing dishes))  Progression: worsening    Social Support  Lives in: [facility]  Lives with: alone    Hand dominance: right    Treatments  Previous treatment: physical therapy and medication  Current treatment: physical therapy  Patient Goals  Patient goals for therapy: increased strength, increased motion, decreased pain and return to sport/leisure activities          Objective     Special Questions  Positive for disturbed sleep  Negative for night pain, bladder dysfunction, bowel dysfunction and saddle (S4) numbness    Postural Observations  Seated posture: fair  Standing posture: fair  Correction of posture: has no consistent effect        Palpation   Left   No palpable tenderness to the erector spinae and lumbar paraspinals  Hypertonic in the suboccipitals and upper trapezius  Tenderness of the cervical paraspinals, external abdominal oblique, middle trapezius, quadratus lumborum, rhomboids and upper trapezius  Right   No palpable tenderness to the erector spinae and lumbar paraspinals  Hypertonic in the suboccipitals and upper trapezius  Tenderness of the cervical paraspinals, external abdominal oblique, middle trapezius, quadratus lumborum, rhomboids and upper trapezius  Tenderness     Lumbar Spine  Tenderness in the spinous process       Additional Tenderness Details  TTP of spinous process at T10 and L3    Neurological Testing     Additional Neurological Details  Normal UE neuro screen  Normal LE neuro screen    Active Range of Motion     Additional Active Range of Motion Details  Cervical AROM  Flexion: limited 50% with posterior cervical pain  Extension: limited 25% with centralized cervical pain  B/L SB: limited 50% with ipsilateral "pinching" and contralateral stretch  B/L rotation: limited 25% due to stretch/tightness  Lumbar AROM  Flexion: limited 10%  Extension: limited 75% with centralized LBP  B/L SB: limited 50% with ipsilateral pain  B/L rotation: WNL    Strength/Myotome Testing     Left Shoulder     Planes of Motion   Flexion: 3+   Extension: 3   Abduction: 3+   External rotation at 0°: 3+ Internal rotation at 0°: 4-     Isolated Muscles   Lower trapezius: 3+   Middle trapezius: 3+   Upper trapezius: 3+     Right Shoulder     Planes of Motion   Flexion: 3+   Extension: 3   Abduction: 3+   External rotation at 0°: 4-   Internal rotation at 0°: 3+     Isolated Muscles   Lower trapezius: 3+   Middle trapezius: 3+   Upper trapezius: 3+     Left Elbow   Flexion: 4-  Extension: 4-    Right Elbow   Flexion: 4-  Extension: 4-    Left Hip   Planes of Motion   Flexion: 3+  Extension: 3+  Abduction: 3+  Adduction: 4    Right Hip   Planes of Motion   Flexion: 4-  Extension: 3+  Abduction: 4-  Adduction: 4    Left Knee   Flexion: 4-  Extension: 4-    Right Knee   Flexion: 4-  Extension: 4-    Left Ankle/Foot   Dorsiflexion: 4-  Plantar flexion: 4-    Right Ankle/Foot   Dorsiflexion: 4-  Plantar flexion: 4-    Tests   Cervical     Left   Positive cervical compression test     Negative cervical distraction  Right   Positive cervical compression test    Negative cervical distraction  Thoracic   Positive slump  Lumbar   Positive slumped  Left   Positive passive SLR  Right   Positive passive SLR         Flowsheet Rows      Most Recent Value   PT/OT G-Codes   Current Score  34   Projected Score  45          Precautions: DM type II, fibromyalgia, Grave's disease, HTN, rheumatoid arthritis, anxiety, depression    Daily Treatment Diary     Manuals             Gentle cervical stretching, STM                                                    Exercise Diary              Bike warm up             LB stretch pball rollout             Chin tucks             Scapular retractions             Cervical S/B isometrics                          LTR             Isometric hip abduction             Isometric hip adduction             TA activation             TA with bridge             TA with iso hip flexion                          TA w/ TB rows             TA w/ TB ext             TB trunk rotation             TB Pallof press                                                                 Modalities             MHP/CP prn

## 2019-01-09 NOTE — TELEPHONE ENCOUNTER
From: Mallorie Lozano  Sent: 1/9/2019 4:54 PM EST  Subject: Medication Renewal Request    Jesus Lozano would like a refill of the following medications:     RABEprazole (ACIPHEX) 20 MG tablet Jeferson Govea DO]     ALPRAZolam Hollie Betters) 1 mg tablet Jeferson Govea DO]    Preferred pharmacy: [id number]

## 2019-01-10 RX ORDER — RABEPRAZOLE SODIUM 20 MG/1
20 TABLET, DELAYED RELEASE ORAL DAILY
Qty: 90 TABLET | Refills: 0 | Status: SHIPPED | OUTPATIENT
Start: 2019-01-10 | End: 2019-03-06 | Stop reason: SDUPTHER

## 2019-01-10 RX ORDER — ALPRAZOLAM 1 MG/1
TABLET ORAL
Qty: 120 TABLET | Refills: 0 | Status: SHIPPED | OUTPATIENT
Start: 2019-01-10 | End: 2019-02-26 | Stop reason: SDUPTHER

## 2019-01-14 ENCOUNTER — APPOINTMENT (OUTPATIENT)
Dept: PHYSICAL THERAPY | Facility: CLINIC | Age: 59
End: 2019-01-14
Payer: MEDICARE

## 2019-01-16 ENCOUNTER — OFFICE VISIT (OUTPATIENT)
Dept: PHYSICAL THERAPY | Facility: CLINIC | Age: 59
End: 2019-01-16
Payer: MEDICARE

## 2019-01-16 DIAGNOSIS — M25.50 MULTIPLE JOINT PAIN: Primary | ICD-10-CM

## 2019-01-16 PROCEDURE — 97140 MANUAL THERAPY 1/> REGIONS: CPT

## 2019-01-16 PROCEDURE — 97110 THERAPEUTIC EXERCISES: CPT

## 2019-01-16 NOTE — PROGRESS NOTES
Daily Note     Today's date: 2019  Patient name: Bruce Caicedo  : 1960  MRN: [id number]  Referring provider: Prince Stone DO  Dx:   Encounter Diagnosis     ICD-10-CM    1  Multiple joint pain M25 50                   Subjective: Patient reported symptoms in cervical region and bilateral hips, most likely contributing to the change in weather  Had no significant difficulty completing home program, noting after completion, she had a decrease in flank / torso symptoms  Objective: See treatment diary below      Assessment: Most restrictions present on R side of cervical musculature / soft tissue  Guarded with passive cervical motion  Progressions made to exercises to focus on promoting core stabilization and activation, while also increasing postural strength  Held lumbar stretching to each side as she experienced symptoms into groin region  Will assess response to treatment at next visit and provided updated home program if appropriate  Plan: Continue per plan of care  Progress treatment as tolerated           Precautions: DM type II, fibromyalgia, Grave's disease, HTN, rheumatoid arthritis, anxiety, depression    Daily Treatment Diary   Manuals             Gentle cervical stretching, STM EV                                                   Exercise Diary              Bike warm up NP            LB stretch pball rollout 10"x5 fwd            Chin tucks 3"x20            Scapular retractions 3"x20            Cervical S/B isometrics NV                         LTR 5"x10            Isometric hip abduction Belt  3" hold, 2 min            Isometric hip adduction 3" hold, 2 min            TA activation 3" hold, 2 min            TA with bridge NP            TA with iso hip flexion NP                         TA w/ TB rows Pink  3" hold, 2x10            TA w/ TB ext Pink 2x10            TB trunk rotation NP            TB Pallof press NP Modalities             MHP/CP prn deferred

## 2019-01-23 ENCOUNTER — HOSPITAL ENCOUNTER (OUTPATIENT)
Dept: MAMMOGRAPHY | Facility: MEDICAL CENTER | Age: 59
Discharge: HOME | End: 2019-01-23
Payer: MEDICARE

## 2019-01-23 ENCOUNTER — OFFICE VISIT (OUTPATIENT)
Dept: PHYSICAL THERAPY | Facility: CLINIC | Age: 59
End: 2019-01-23
Payer: MEDICARE

## 2019-01-23 VITALS — WEIGHT: 222 LBS | HEIGHT: 65 IN | BODY MASS INDEX: 36.99 KG/M2

## 2019-01-23 DIAGNOSIS — Z12.39 BREAST CANCER SCREENING: ICD-10-CM

## 2019-01-23 DIAGNOSIS — M25.50 MULTIPLE JOINT PAIN: Primary | ICD-10-CM

## 2019-01-23 PROCEDURE — 97140 MANUAL THERAPY 1/> REGIONS: CPT

## 2019-01-23 PROCEDURE — 77067 SCR MAMMO BI INCL CAD: CPT

## 2019-01-23 PROCEDURE — 77063 BREAST TOMOSYNTHESIS BI: CPT

## 2019-01-23 PROCEDURE — 97110 THERAPEUTIC EXERCISES: CPT

## 2019-01-23 NOTE — PROGRESS NOTES
Daily Note     Today's date: 2019  Patient name: Carlos A Francisco  : 1960  MRN: [id number]  Referring provider: Jos Jones DO  Dx:   Encounter Diagnosis     ICD-10-CM    1  Multiple joint pain M25 50                   Subjective: Patient reported having no adverse response to previous treatment, experiencing minimal soreness  Good tolerance to home program, maintaining compliance  Believes the change in weather continues to play a part in her increased symptoms, although has still not experienced return of flank / rib symptoms  Objective: See treatment diary below  Updated home program       Assessment: Manual remains appropriate to increase soft tissue extensibility in cervical region  ROM remains limited  Had some cramping in anterior hip / groin with LTR, holding further reps  Continued focus on increasing core activation / stabilization for reduced stress placed on lumbar spine  Plan: Continue per plan of care  Progress treatment as tolerated           Precautions: DM type II, fibromyalgia, Grave's disease, HTN, rheumatoid arthritis, anxiety, depression    Daily Treatment Diary   Manuals            Gentle cervical stretching, STM EV EV                                                  Exercise Diary              Bike warm up NP NP           LB stretch pball rollout 10"x5 fwd 10"x5           Chin tucks 3"x20 3"x20           Scapular retractions 3"x20 3"x20           Cervical S/B isometrics NV NV                        LTR 5"x10 Attempted, pain           Isometric hip abduction Belt  3" hold, 2 min Belt 3" hold, 2 min           Isometric hip adduction 3" hold, 2 min 3" hold, 2 min           TA activation 3" hold, 2 min 3" hold, 2 min           TA with bridge NP NP           TA with iso hip flexion NP NP                        TA w/ TB rows Pink  3" hold, 2x10 Pink 3" hold, 2x10           TA w/ TB ext Pink 2x10 Pink 2x10           TB trunk rotation NP NP           TB Pallof press NP NP Modalities             MHP/CP prn deferred deferred                          HEP: TB ext & rows

## 2019-01-25 ENCOUNTER — OFFICE VISIT (OUTPATIENT)
Dept: PHYSICAL THERAPY | Facility: CLINIC | Age: 59
End: 2019-01-25
Payer: MEDICARE

## 2019-01-25 DIAGNOSIS — M25.50 MULTIPLE JOINT PAIN: Primary | ICD-10-CM

## 2019-01-25 PROCEDURE — 97140 MANUAL THERAPY 1/> REGIONS: CPT

## 2019-01-25 PROCEDURE — 97110 THERAPEUTIC EXERCISES: CPT

## 2019-01-25 NOTE — PROGRESS NOTES
[facility]  Procedure Note    Gagan Owen Patient Status:  Observation    1930 MRN [id number]   [facility] 3SW-A Attending Sharri Juarez MD   Hosp Day # 0 PCP Jo Ann Kirkpatrick DO     Procedure: L2 Kyphoplasty    Pre-Procedure Brian Pike LIDOCAINE WAS DENIED THROUGH [employer] MEDICARE ID # [id number], DUE TO DX  KAEL HAD REQUESTED THE AUTH  I LM AT [facility] PT CAN PAY OOP OR SHE CAN TRY THE 4% LIDOCAINE OTC ([facility])  Pt was notified and will try the salon pods

## 2019-01-25 NOTE — PROGRESS NOTES
Daily Note     Today's date: 2019  Patient name: Teri Bean  : 1960  MRN: [id number]  Referring provider: Love Benites DO  Dx:   Encounter Diagnosis     ICD-10-CM    1  Multiple joint pain M25 50                   Subjective: Patient reported continued improvement in cervical symptoms and no worsening of flank pain  Did have some symptoms present yesterday, however attributed this to the weather  Reported feeling as though she was about to have an anxiety attack this morning and has been trying to avoid taking medication if she does not have to  * Noted feeling better overall ( reduced anxiety ) after coming to PT  Objective: See treatment diary below      Assessment: Improved activation and ability to maintain core stabilization with exercises  L flank symptoms present with attempt to complete seated pball flex to L also noted increased medial foot discomfort  Cervical isometrics added with some increase in symptoms  Plan: Continue per plan of care  Progress treatment as tolerated           Precautions: DM type II, fibromyalgia, Grave's disease, HTN, rheumatoid arthritis, anxiety, depression    Daily Treatment Diary   Manuals           Gentle cervical stretching, STM EV EV EV                                                 Exercise Diary              Bike warm up NP NP NP          LB stretch pball rollout 10"x5 fwd 10"x5 10"x5          Chin tucks 3"x20 3"x20 3"x20          Scapular retractions 3"x20 3"x20 --          Cervical S/B isometrics NV NV 5"x10                       LTR 5"x10 Attempted, pain HELD          Isometric hip abduction Belt  3" hold, 2 min Belt 3" hold, 2 min Belt  3" hold, 2 min          Isometric hip adduction 3" hold, 2 min 3" hold, 2 min 3" hold, 2 min          TA activation 3" hold, 2 min 3" hold, 2 min 3" hold, 2 min          TA with bridge NP NP NP          TA with iso hip flexion NP NP NP                       TA w/ TB rows Pink  3" hold, 2x10 Pink 3" hold, 2x10 Pink 3" hold, 2x10          TA w/ TB ext Pink 2x10 Pink 2x10 Pink 2x10          TB trunk rotation NP NP NP          TB Pallof press NP NP NP                                                              Modalities             MHP/CP prn deferred deferred deferred                         HEP: TB ext & rows ( pink )

## 2019-01-28 ENCOUNTER — OFFICE VISIT (OUTPATIENT)
Dept: PHYSICAL THERAPY | Facility: CLINIC | Age: 59
End: 2019-01-28
Payer: MEDICARE

## 2019-01-28 DIAGNOSIS — M25.50 MULTIPLE JOINT PAIN: Primary | ICD-10-CM

## 2019-01-28 PROCEDURE — 97140 MANUAL THERAPY 1/> REGIONS: CPT

## 2019-01-28 PROCEDURE — 97110 THERAPEUTIC EXERCISES: CPT

## 2019-01-28 NOTE — PROGRESS NOTES
Daily Note     Today's date: 2019  Patient name: Naresh Brar  : 1960  MRN: [id number]  Referring provider: Mable Asencio DO  Dx:   Encounter Diagnosis     ICD-10-CM    1  Multiple joint pain M25 50                   Subjective: Patient reports her body feels very sore today, stating "I can tell a storm is coming "      Objective: See treatment diary below  Assessment: Patient tolerates treatment well, however she noticed R anterior hip discomfort during cervical stretching to the L  This discomfort continued with performance of hook lying hip isometric adduction, but she noticed it subsided after a few minutes  Good performance of Pallof presses, with cueing needed for proper performance  Plan: Continue per plan of care  Progress treatment as tolerated           Precautions: DM type II, fibromyalgia, Grave's disease, HTN, rheumatoid arthritis, anxiety, depression    Daily Treatment Diary    Manuals          Gentle cervical stretching, STM EV EV EV AN                                                Exercise Diary              Bike warm up NP NP NP NP         LB stretch pball rollout 10"x5 fwd 10"x5 10"x5 10"x5         Chin tucks 3"x20 3"x20 3"x20 3"x30         Scapular retractions 3"x20 3"x20 --          Cervical S/B isometrics NV NV 5"x10 5"x10                      LTR 5"x10 Attempted, pain HELD Held         Isometric hip abduction Belt  3" hold, 2 min Belt 3" hold, 2 min Belt  3" hold, 2 min Belt 3" hold, 2 min         Isometric hip adduction 3" hold, 2 min 3" hold, 2 min 3" hold, 2 min 3" hold, 2 min         TA activation 3" hold, 2 min 3" hold, 2 min 3" hold, 2 min HEP         TA with bridge NP NP NP NP         TA with iso hip flexion NP NP NP NP                      TA w/ TB rows Pink  3" hold, 2x10 Pink 3" hold, 2x10 Pink 3" hold, 2x10 PTB 3" 2x10         TA w/ TB ext Pink 2x10 Pink 2x10 Pink 2x10 PTB 2x10         TB trunk rotation NP NP NP NP         TB Pallof press B/L NP NP NP PTB 5" x10                                                             Modalities             MHP/CP prn deferred deferred deferred Defers

## 2019-01-31 DIAGNOSIS — F41.9 ANXIETY: ICD-10-CM

## 2019-01-31 NOTE — TELEPHONE ENCOUNTER
From: Brian Lozano  Sent: 1/31/2019 5:41 PM EST  Subject: Medication Renewal Request    Jesus Lozano would like a refill of the following medications:     busPIRone (BUSPAR) 10 mg tablet Carisa Query, DO]    Preferred pharmacy: [id number]

## 2019-02-01 ENCOUNTER — TELEPHONE (OUTPATIENT)
Dept: FAMILY MEDICINE CLINIC | Facility: CLINIC | Age: 59
End: 2019-02-01

## 2019-02-01 ENCOUNTER — APPOINTMENT (OUTPATIENT)
Dept: PHYSICAL THERAPY | Facility: CLINIC | Age: 59
End: 2019-02-01
Payer: MEDICARE

## 2019-02-01 DIAGNOSIS — F41.9 ANXIETY: ICD-10-CM

## 2019-02-01 RX ORDER — BUSPIRONE HYDROCHLORIDE 10 MG/1
10 TABLET ORAL 3 TIMES DAILY
Qty: 90 TABLET | Refills: 0 | Status: SHIPPED | OUTPATIENT
Start: 2019-02-01 | End: 2019-02-01 | Stop reason: SDUPTHER

## 2019-02-01 RX ORDER — BUSPIRONE HYDROCHLORIDE 5 MG/1
5 TABLET ORAL 3 TIMES DAILY
Qty: 90 TABLET | Refills: 3 | Status: SHIPPED | OUTPATIENT
Start: 2019-02-01 | End: 2019-03-06 | Stop reason: SDUPTHER

## 2019-02-01 NOTE — TELEPHONE ENCOUNTER
Pt called and stated that you [facility]one 10 mg it is on back order and they don't know when it will be in  She called [facility] on [address] and they have buspirone 5 mg in stock  Please send in a new script for the 5 mg 3 times daily I was going to t this up for you but wanted you to be aware of the reason why we need to change this

## 2019-02-05 DIAGNOSIS — E03.9 ACQUIRED HYPOTHYROIDISM: ICD-10-CM

## 2019-02-06 RX ORDER — LEVOTHYROXINE SODIUM 0.07 MG/1
TABLET ORAL
Qty: 90 TABLET | Refills: 1 | Status: SHIPPED | OUTPATIENT
Start: 2019-02-06 | End: 2019-03-06 | Stop reason: SDUPTHER

## 2019-02-07 ENCOUNTER — CLINICAL SUPPORT (OUTPATIENT)
Dept: FAMILY MEDICINE CLINIC | Facility: CLINIC | Age: 59
End: 2019-02-07
Payer: MEDICARE

## 2019-02-07 DIAGNOSIS — E78.1 HYPERTRIGLYCERIDEMIA: ICD-10-CM

## 2019-02-07 DIAGNOSIS — E03.9 HYPOTHYROIDISM, UNSPECIFIED TYPE: ICD-10-CM

## 2019-02-07 DIAGNOSIS — I10 ESSENTIAL HYPERTENSION: ICD-10-CM

## 2019-02-07 DIAGNOSIS — M25.50 MULTIPLE JOINT PAIN: ICD-10-CM

## 2019-02-07 DIAGNOSIS — E11.9 TYPE 2 DIABETES MELLITUS WITHOUT COMPLICATION, WITHOUT LONG-TERM CURRENT USE OF INSULIN (HCC): ICD-10-CM

## 2019-02-07 LAB
ALBUMIN SERPL BCP-MCNC: 4.4 G/DL (ref 3.5–5)
ALP SERPL-CCNC: 77 U/L (ref 46–116)
ALT SERPL W P-5'-P-CCNC: 70 U/L (ref 12–78)
ANION GAP SERPL CALCULATED.3IONS-SCNC: 11 MMOL/L (ref 4–13)
AST SERPL W P-5'-P-CCNC: 42 U/L (ref 5–45)
BASOPHILS # BLD AUTO: 0.07 THOUSANDS/ΜL (ref 0–0.1)
BASOPHILS NFR BLD AUTO: 1 % (ref 0–1)
BILIRUB SERPL-MCNC: 0.57 MG/DL (ref 0.2–1)
BUN SERPL-MCNC: 12 MG/DL (ref 5–25)
CALCIUM SERPL-MCNC: 9.1 MG/DL (ref 8.3–10.1)
CHLORIDE SERPL-SCNC: 103 MMOL/L (ref 100–108)
CHOLEST SERPL-MCNC: 242 MG/DL (ref 50–200)
CO2 SERPL-SCNC: 23 MMOL/L (ref 21–32)
CREAT SERPL-MCNC: 0.99 MG/DL (ref 0.6–1.3)
CRP SERPL QL: 6.7 MG/L
EOSINOPHIL # BLD AUTO: 0.16 THOUSAND/ΜL (ref 0–0.61)
EOSINOPHIL NFR BLD AUTO: 2 % (ref 0–6)
ERYTHROCYTE [DISTWIDTH] IN BLOOD BY AUTOMATED COUNT: 13.1 % (ref 11.6–15.1)
EST. AVERAGE GLUCOSE BLD GHB EST-MCNC: 212 MG/DL
GFR SERPL CREATININE-BSD FRML MDRD: 63 ML/MIN/1.73SQ M
GLUCOSE P FAST SERPL-MCNC: 272 MG/DL (ref 65–99)
HBA1C MFR BLD: 9 % (ref 4.2–6.3)
HCT VFR BLD AUTO: 48.3 % (ref 34.8–46.1)
HDLC SERPL-MCNC: 46 MG/DL (ref 40–60)
HGB BLD-MCNC: 15.3 G/DL (ref 11.5–15.4)
IMM GRANULOCYTES # BLD AUTO: 0.03 THOUSAND/UL (ref 0–0.2)
IMM GRANULOCYTES NFR BLD AUTO: 0 % (ref 0–2)
LDLC SERPL CALC-MCNC: 140 MG/DL (ref 0–100)
LYMPHOCYTES # BLD AUTO: 3.13 THOUSANDS/ΜL (ref 0.6–4.47)
LYMPHOCYTES NFR BLD AUTO: 39 % (ref 14–44)
MCH RBC QN AUTO: 30.4 PG (ref 26.8–34.3)
MCHC RBC AUTO-ENTMCNC: 31.7 G/DL (ref 31.4–37.4)
MCV RBC AUTO: 96 FL (ref 82–98)
MONOCYTES # BLD AUTO: 0.93 THOUSAND/ΜL (ref 0.17–1.22)
MONOCYTES NFR BLD AUTO: 12 % (ref 4–12)
NEUTROPHILS # BLD AUTO: 3.71 THOUSANDS/ΜL (ref 1.85–7.62)
NEUTS SEG NFR BLD AUTO: 46 % (ref 43–75)
NRBC BLD AUTO-RTO: 0 /100 WBCS
PLATELET # BLD AUTO: 251 THOUSANDS/UL (ref 149–390)
PMV BLD AUTO: 12.3 FL (ref 8.9–12.7)
POTASSIUM SERPL-SCNC: 4.2 MMOL/L (ref 3.5–5.3)
PROT SERPL-MCNC: 8 G/DL (ref 6.4–8.2)
RBC # BLD AUTO: 5.04 MILLION/UL (ref 3.81–5.12)
SODIUM SERPL-SCNC: 137 MMOL/L (ref 136–145)
T4 FREE SERPL-MCNC: 1.34 NG/DL (ref 0.76–1.46)
TRIGL SERPL-MCNC: 279 MG/DL
TSH SERPL DL<=0.05 MIU/L-ACNC: 4.05 UIU/ML (ref 0.36–3.74)
WBC # BLD AUTO: 8.03 THOUSAND/UL (ref 4.31–10.16)

## 2019-02-07 PROCEDURE — 80061 LIPID PANEL: CPT | Performed by: FAMILY MEDICINE

## 2019-02-07 PROCEDURE — 86430 RHEUMATOID FACTOR TEST QUAL: CPT | Performed by: FAMILY MEDICINE

## 2019-02-07 PROCEDURE — 84443 ASSAY THYROID STIM HORMONE: CPT | Performed by: FAMILY MEDICINE

## 2019-02-07 PROCEDURE — 86140 C-REACTIVE PROTEIN: CPT | Performed by: FAMILY MEDICINE

## 2019-02-07 PROCEDURE — 80053 COMPREHEN METABOLIC PANEL: CPT | Performed by: FAMILY MEDICINE

## 2019-02-07 PROCEDURE — 84439 ASSAY OF FREE THYROXINE: CPT

## 2019-02-07 PROCEDURE — 85025 COMPLETE CBC W/AUTO DIFF WBC: CPT | Performed by: FAMILY MEDICINE

## 2019-02-07 PROCEDURE — 83036 HEMOGLOBIN GLYCOSYLATED A1C: CPT | Performed by: FAMILY MEDICINE

## 2019-02-07 PROCEDURE — 36415 COLL VENOUS BLD VENIPUNCTURE: CPT | Performed by: FAMILY MEDICINE

## 2019-02-08 LAB — RHEUMATOID FACT SER QL LA: NEGATIVE

## 2019-02-11 ENCOUNTER — APPOINTMENT (OUTPATIENT)
Dept: PHYSICAL THERAPY | Facility: CLINIC | Age: 59
End: 2019-02-11
Payer: MEDICARE

## 2019-02-13 ENCOUNTER — APPOINTMENT (OUTPATIENT)
Dept: PHYSICAL THERAPY | Facility: CLINIC | Age: 59
End: 2019-02-13
Payer: MEDICARE

## 2019-02-15 ENCOUNTER — OFFICE VISIT (OUTPATIENT)
Dept: PHYSICAL THERAPY | Facility: CLINIC | Age: 59
End: 2019-02-15
Payer: MEDICARE

## 2019-02-15 DIAGNOSIS — M25.50 MULTIPLE JOINT PAIN: Primary | ICD-10-CM

## 2019-02-15 PROCEDURE — 97110 THERAPEUTIC EXERCISES: CPT

## 2019-02-15 PROCEDURE — 97140 MANUAL THERAPY 1/> REGIONS: CPT

## 2019-02-15 NOTE — PROGRESS NOTES
Daily Note     Today's date: 2/15/2019  Patient name: Jerrica Canseco  : 1960  MRN: [id number]  Referring provider: Nikole Wu DO  Dx:   Encounter Diagnosis     ICD-10-CM    1  Multiple joint pain M25 50                   Subjective: Patient reports she feels okay today, however "I am a little sore "      Objective: See treatment diary below  FOTO score improved from 34 to 40 in 6 visits  Assessment: Patient tolerates treatment well  She continued to have discomfort in R anterior hip/groin with manual UT stretch to the L again today  Patient would benefit from continued skilled therapy in an effort to improve function  Plan: Continue per plan of care  Progress treatment as tolerated           Precautions: DM type II, fibromyalgia, Grave's disease, HTN, rheumatoid arthritis, anxiety, depression    Daily Treatment Diary    Manuals 1/16 1/23 1/25 1/28 2/15        Gentle cervical stretching, STM EV EV EV AN AN                                               Exercise Diary              Bike warm up NP NP NP NP Try NV        LB stretch pball rollout 10"x5 fwd 10"x5 10"x5 10"x5 10" x10        Chin tucks 3"x20 3"x20 3"x20 3"x30 3"x30        Scapular retractions 3"x20 3"x20 --          Cervical S/B isometrics NV NV 5"x10 5"x10 5"x10                     LTR 5"x10 Attempted, pain HELD Held NV        Isometric hip abduction Belt  3" hold, 2 min Belt 3" hold, 2 min Belt  3" hold, 2 min Belt 3" hold, 2 min Belt 3" hold, 2 min        Isometric hip adduction 3" hold, 2 min 3" hold, 2 min 3" hold, 2 min 3" hold, 2 min 3" hold, 2 min        TA activation 3" hold, 2 min 3" hold, 2 min 3" hold, 2 min HEP HEP        TA with bridge NP NP NP NP NP        TA with iso hip flexion NP NP NP NP NP                     TA w/ TB rows Pink  3" hold, 2x10 Pink 3" hold, 2x10 Pink 3" hold, 2x10 PTB 3" 2x10 PTB 3" 3x10        TA w/ TB ext Pink 2x10 Pink 2x10 Pink 2x10 PTB 2x10 PTB 3x10        TB trunk rotation NP NP NP NP NP        TB Pallof press B/L NP NP NP PTB 5" x10 PTB 5" x10                                                            Modalities             MHP/CP prn deferred deferred deferred Defers Defers

## 2019-02-20 ENCOUNTER — APPOINTMENT (OUTPATIENT)
Dept: PHYSICAL THERAPY | Facility: CLINIC | Age: 59
End: 2019-02-20
Payer: MEDICARE

## 2019-02-22 ENCOUNTER — OFFICE VISIT (OUTPATIENT)
Dept: FAMILY MEDICINE CLINIC | Facility: CLINIC | Age: 59
End: 2019-02-22
Payer: MEDICARE

## 2019-02-22 ENCOUNTER — OFFICE VISIT (OUTPATIENT)
Dept: PHYSICAL THERAPY | Facility: CLINIC | Age: 59
End: 2019-02-22
Payer: MEDICARE

## 2019-02-22 VITALS
TEMPERATURE: 97.7 F | RESPIRATION RATE: 17 BRPM | WEIGHT: 230.19 LBS | SYSTOLIC BLOOD PRESSURE: 126 MMHG | OXYGEN SATURATION: 99 % | HEIGHT: 65 IN | DIASTOLIC BLOOD PRESSURE: 86 MMHG | HEART RATE: 88 BPM | BODY MASS INDEX: 38.35 KG/M2

## 2019-02-22 DIAGNOSIS — E11.9 TYPE 2 DIABETES MELLITUS WITHOUT COMPLICATION, WITHOUT LONG-TERM CURRENT USE OF INSULIN (HCC): ICD-10-CM

## 2019-02-22 DIAGNOSIS — M25.50 MULTIPLE JOINT PAIN: Primary | ICD-10-CM

## 2019-02-22 DIAGNOSIS — I10 ESSENTIAL HYPERTENSION: ICD-10-CM

## 2019-02-22 DIAGNOSIS — E03.9 HYPOTHYROIDISM, UNSPECIFIED TYPE: Primary | ICD-10-CM

## 2019-02-22 PROCEDURE — 99214 OFFICE O/P EST MOD 30 MIN: CPT | Performed by: FAMILY MEDICINE

## 2019-02-22 PROCEDURE — 97140 MANUAL THERAPY 1/> REGIONS: CPT

## 2019-02-22 PROCEDURE — 97110 THERAPEUTIC EXERCISES: CPT

## 2019-02-22 NOTE — PROGRESS NOTES
Assessment/Plan:  Patient is here to follow up on her  Chronic medical conditions  Her diabets control is much worse  She has been having significant joint pain  She is going to PT  I will see her back in three months so we can keep an eye on her diabetes closer  Diagnoses and all orders for this visit:    Hypothyroidism, unspecified type  -     TSH, 3rd generation with Free T4 reflex; Future    Type 2 diabetes mellitus without complication, without long-term current use of insulin (HCC)  -     Discontinue: metFORMIN (GLUCOPHAGE) 500 mg tablet; Take 1 tablet (500 mg total) by mouth 3 (three) times a day with meals  -     HEMOGLOBIN A1C W/ EAG ESTIMATION; Future  -     Comprehensive metabolic panel; Future    Essential hypertension  -     Comprehensive metabolic panel; Future          Subjective:   Chief Complaint   Patient presents with    Follow-up     6w check up to chronic conditions and to discuss blood work from 2/7/19        Patient ID: Barbra Colbert is a 62 y o  female  Patient is here for diabetes recheck - she has not been watching diet  The following portions of the patient's history were reviewed and updated as appropriate: allergies, current medications, past family history, past medical history, past social history, past surgical history and problem list     Review of Systems   Constitutional: Negative for chills and fever  HENT: Negative for congestion and sore throat  Respiratory: Negative for chest tightness  Cardiovascular: Negative for chest pain and palpitations  Gastrointestinal: Negative for abdominal pain, constipation, diarrhea and nausea  Genitourinary: Negative for difficulty urinating  Musculoskeletal: Positive for arthralgias  Skin: Negative  Neurological: Negative for dizziness and headaches  Psychiatric/Behavioral: Negative            Objective:      /86 (BP Location: Left arm, Patient Position: Sitting, Cuff Size: Large)   Pulse 88   Temp 97 7 °F (36 5 °C) (Tympanic)   Resp 17   Ht 5' 4 5" (1 638 m)   Wt 104 kg (230 lb 3 oz)   SpO2 99%   Breastfeeding? No   BMI 38 90 kg/m²          Physical Exam   Constitutional: She is oriented to person, place, and time  No distress  Obese  Cardiovascular: Normal rate and regular rhythm  Pulmonary/Chest: Effort normal and breath sounds normal    Musculoskeletal: She exhibits no edema  Lymphadenopathy:     She has no cervical adenopathy  Neurological: She is alert and oriented to person, place, and time  Skin: Skin is warm and dry  Psychiatric: She has a normal mood and affect  Vitals reviewed

## 2019-02-22 NOTE — PROGRESS NOTES
Daily Note     Today's date: 2019  Patient name: Kee Marin  : 1960  MRN: [id number]  Referring provider: Linda Porras DO  Dx:   Encounter Diagnosis     ICD-10-CM    1  Multiple joint pain M25 50                   Subjective: Patient reported symptoms remain affected by the change in weather  Remains compliant to home program  Has had no instance of rib / flank pain recently  Objective: See treatment diary below      Assessment: Presents with continued soft tissue restrictions in cervical spine, along with limited mobility and guarding with ROM  Noted an increase in shoulder and scapular soreness following exercises  Resume exercises NV that were not performed today secondary to patient time constraints  Plan: Continue per plan of care  Progress treatment as tolerated           Precautions: DM type II, fibromyalgia, Grave's disease, HTN, rheumatoid arthritis, anxiety, depression    Daily Treatment Diary  Manuals 1/16 1/23 1/25 1/28 2/15 2/22       Gentle cervical stretching, STM EV EV EV AN AN EV                                              Exercise Diary              Bike warm up NP NP NP NP Try NV NP       LB stretch pball rollout 10"x5 fwd 10"x5 10"x5 10"x5 10" x10 10"x 10       Chin tucks 3"x20 3"x20 3"x20 3"x30 3"x30 3"x30       Scapular retractions 3"x20 3"x20 --          Cervical S/B isometrics NV NV 5"x10 5"x10 5"x10 5"x10                    LTR 5"x10 Attempted, pain HELD Held NV NV       Isometric hip abduction Belt  3" hold, 2 min Belt 3" hold, 2 min Belt  3" hold, 2 min Belt 3" hold, 2 min Belt 3" hold, 2 min Belt  3" hold, 2 min       Isometric hip adduction 3" hold, 2 min 3" hold, 2 min 3" hold, 2 min 3" hold, 2 min 3" hold, 2 min 3" hold, 2 min       TA activation 3" hold, 2 min 3" hold, 2 min 3" hold, 2 min HEP HEP --       TA with bridge NP NP NP NP NP NP       TA with iso hip flexion NP NP NP NP NP NP                    TA w/ TB rows Pink  3" hold, 2x10 Pink 3" hold, 2x10 Pink 3" hold, 2x10 PTB 3" 2x10 PTB 3" 3x10 NV       TA w/ TB ext Pink 2x10 Pink 2x10 Pink 2x10 PTB 2x10 PTB 3x10 NV       TB trunk rotation NP NP NP NP NP NP       TB Pallof press B/L NP NP NP PTB 5" x10 PTB 5" x10 NV                                                           Modalities             MHP/CP prn deferred deferred deferred Defers Defers deferred

## 2019-02-26 DIAGNOSIS — F41.9 ANXIETY: ICD-10-CM

## 2019-02-27 ENCOUNTER — OFFICE VISIT (OUTPATIENT)
Dept: PHYSICAL THERAPY | Facility: CLINIC | Age: 59
End: 2019-02-27
Payer: MEDICARE

## 2019-02-27 DIAGNOSIS — M25.50 MULTIPLE JOINT PAIN: Primary | ICD-10-CM

## 2019-02-27 PROCEDURE — 97140 MANUAL THERAPY 1/> REGIONS: CPT

## 2019-02-27 PROCEDURE — 97110 THERAPEUTIC EXERCISES: CPT

## 2019-02-27 NOTE — PROGRESS NOTES
Daily Note     Today's date: 2019  Patient name: Jerry Shin  : 1960  MRN: [id number]  Referring provider: Malia Pickett DO  Dx:   Encounter Diagnosis     ICD-10-CM    1  Multiple joint pain M25 50                   Subjective: Patient reported increased soreness over lower abdominal region, anterior hips, including increased symptoms in bilateral shoulders  Continues to note changes in symptoms are likely related to the weather  Objective: See treatment diary below      Assessment: Manual remains focused on soft tissue restrictions and cervical mobility  Fair to good tolerance to exercises, having an increase in LB pain with supine exercises  Continued to defer modalities to home  Plan: Continue per plan of care  Progress treatment as tolerated           Precautions: DM type II, fibromyalgia, Grave's disease, HTN, rheumatoid arthritis, anxiety, depression    Daily Treatment Diary  Manuals 1/16 1/23 1/25 1/28 2/15 2/22 2/27      Gentle cervical stretching, STM EV EV EV AN AN EV EV                                             Exercise Diary              Bike warm up NP NP NP NP Try NV NP NP      LB stretch pball rollout 10"x5 fwd 10"x5 10"x5 10"x5 10" x10 10"x 10 10"x5 ea      Chin tucks 3"x20 3"x20 3"x20 3"x30 3"x30 3"x30 3"x30      Scapular retractions 3"x20 3"x20 --          Cervical S/B isometrics NV NV 5"x10 5"x10 5"x10 5"x10 5"x10                   LTR 5"x10 Attempted, pain HELD Held NV NV NV      Isometric hip abduction Belt  3" hold, 2 min Belt 3" hold, 2 min Belt  3" hold, 2 min Belt 3" hold, 2 min Belt 3" hold, 2 min Belt  3" hold, 2 min Belt 3" hold, 2 min      Isometric hip adduction 3" hold, 2 min 3" hold, 2 min 3" hold, 2 min 3" hold, 2 min 3" hold, 2 min 3" hold, 2 min 3" hold, 2 min      TA activation 3" hold, 2 min 3" hold, 2 min 3" hold, 2 min HEP HEP -- --      TA with bridge NP NP NP NP NP NP NP      TA with iso hip flexion NP NP NP NP NP NP NP                   TA w/ TB rows Pink  3" hold, 2x10 Pink 3" hold, 2x10 Pink 3" hold, 2x10 PTB 3" 2x10 PTB 3" 3x10 NV Pink  3" hold, 2x10      TA w/ TB ext Pink 2x10 Pink 2x10 Pink 2x10 PTB 2x10 PTB 3x10 NV Pink 2x10      TB trunk rotation NP NP NP NP NP NP NP      TB Pallof press B/L NP NP NP PTB 5" x10 PTB 5" x10 NV NV                                                          Modalities             MHP/CP prn deferred deferred deferred Defers Defers deferred deferred

## 2019-02-28 DIAGNOSIS — F41.9 ANXIETY: ICD-10-CM

## 2019-02-28 RX ORDER — ALPRAZOLAM 1 MG/1
TABLET ORAL
Qty: 120 TABLET | Refills: 0 | Status: SHIPPED | OUTPATIENT
Start: 2019-02-28 | End: 2019-03-06 | Stop reason: SDUPTHER

## 2019-02-28 RX ORDER — ALPRAZOLAM 1 MG/1
TABLET ORAL
Qty: 120 TABLET | Refills: 0 | Status: CANCELLED | OUTPATIENT
Start: 2019-02-28

## 2019-03-01 ENCOUNTER — OFFICE VISIT (OUTPATIENT)
Dept: PHYSICAL THERAPY | Facility: CLINIC | Age: 59
End: 2019-03-01
Payer: MEDICARE

## 2019-03-01 DIAGNOSIS — M25.50 MULTIPLE JOINT PAIN: Primary | ICD-10-CM

## 2019-03-01 PROCEDURE — 97110 THERAPEUTIC EXERCISES: CPT

## 2019-03-01 PROCEDURE — 97140 MANUAL THERAPY 1/> REGIONS: CPT

## 2019-03-01 NOTE — PROGRESS NOTES
Daily Note     Today's date: 3/1/2019  Patient name: Benny Suero  : 1960  MRN: [id number]  Referring provider: Smooth Walton DO  Dx:   Encounter Diagnosis     ICD-10-CM    1  Multiple joint pain M25 50                   Subjective: Patient reported symptoms slightly improved since previous visit, recently having an increase in LE muscle cramping that wakes her up at night  Objective: See treatment diary below      Assessment: Increased symptoms with manual R UT stretch, improved tolerance on L  Presents with unresolved soft tissue restrictions that are addressed with manual  Able to resume LTR with no increase in symptoms  Overall, improved tolerance to exercises today  Plan: Continue per plan of care  Progress treatment as tolerated           Precautions: DM type II, fibromyalgia, Grave's disease, HTN, rheumatoid arthritis, anxiety, depression    Daily Treatment Diary  Manuals 1/16 1/23 1/25 1/28 2/15 2/22 2/27 3/1     Gentle cervical stretching, STM EV EV EV AN AN EV EV EV                                            Exercise Diary              Bike warm up NP NP NP NP Try NV NP NP NP     LB stretch pball rollout 10"x5 fwd 10"x5 10"x5 10"x5 10" x10 10"x 10 10"x5 ea 10"x5 ea     Chin tucks 3"x20 3"x20 3"x20 3"x30 3"x30 3"x30 3"x30 3"x30     Scapular retractions 3"x20 3"x20 --          Cervical S/B isometrics NV NV 5"x10 5"x10 5"x10 5"x10 5"x10 5"x10                  LTR 5"x10 Attempted, pain HELD Held NV NV NV 5"x5     Isometric hip abduction Belt  3" hold, 2 min Belt 3" hold, 2 min Belt  3" hold, 2 min Belt 3" hold, 2 min Belt 3" hold, 2 min Belt  3" hold, 2 min Belt 3" hold, 2 min Belt  3" hold, 2 min     Isometric hip adduction 3" hold, 2 min 3" hold, 2 min 3" hold, 2 min 3" hold, 2 min 3" hold, 2 min 3" hold, 2 min 3" hold, 2 min 3" hold, 2 min     TA activation 3" hold, 2 min 3" hold, 2 min 3" hold, 2 min HEP HEP -- -- --     TA with bridge NP NP NP NP NP NP NP NP     TA with iso hip flexion NP NP NP NP NP NP NP NP                  TA w/ TB rows Pink  3" hold, 2x10 Pink 3" hold, 2x10 Pink 3" hold, 2x10 PTB 3" 2x10 PTB 3" 3x10 NV Pink  3" hold, 2x10 Pink  3" hold, 2x10     TA w/ TB ext Pink 2x10 Pink 2x10 Pink 2x10 PTB 2x10 PTB 3x10 NV Pink 2x10 Pink  2x10     TB trunk rotation NP NP NP NP NP NP NP NP     TB Pallof press B/L NP NP NP PTB 5" x10 PTB 5" x10 NV NV Pink  5"x10                                                         Modalities             MHP/CP prn deferred deferred deferred Defers Defers deferred deferred                     10 min of exercise not directly supervised

## 2019-03-04 ENCOUNTER — APPOINTMENT (OUTPATIENT)
Dept: PHYSICAL THERAPY | Facility: CLINIC | Age: 59
End: 2019-03-04
Payer: MEDICARE

## 2019-03-05 ENCOUNTER — APPOINTMENT (OUTPATIENT)
Dept: PHYSICAL THERAPY | Facility: CLINIC | Age: 59
End: 2019-03-05
Payer: MEDICARE

## 2019-03-06 DIAGNOSIS — M19.90 ARTHRITIS: ICD-10-CM

## 2019-03-06 DIAGNOSIS — E03.9 ACQUIRED HYPOTHYROIDISM: ICD-10-CM

## 2019-03-06 DIAGNOSIS — I10 ESSENTIAL HYPERTENSION: ICD-10-CM

## 2019-03-06 DIAGNOSIS — E11.9 TYPE 2 DIABETES MELLITUS WITHOUT COMPLICATION, WITHOUT LONG-TERM CURRENT USE OF INSULIN (HCC): ICD-10-CM

## 2019-03-06 DIAGNOSIS — E78.2 MIXED HYPERLIPIDEMIA: ICD-10-CM

## 2019-03-06 DIAGNOSIS — K21.9 GASTROESOPHAGEAL REFLUX DISEASE, ESOPHAGITIS PRESENCE NOT SPECIFIED: ICD-10-CM

## 2019-03-06 DIAGNOSIS — F41.9 ANXIETY: ICD-10-CM

## 2019-03-06 NOTE — TELEPHONE ENCOUNTER
Patient would like a printed copy of her RX she is going away to be with her daughter for the whole month of April and is worried she may need them they do not have a [facility] down [facility]:    Alprazolam 1 mg tablet- 120 tablet- 1 tab every 6 to 8 hours and needed    Meformin- 500 Mg tablet -270 tablets- take 1 tablet by mouth 3 times a day with meals    Levothyroxine 75 mg tablet - 90 tablets- take 1 tablet my mouth every day    Buspirone 5 mg tablet -90 tablets - take 1 tablet by mouth 3 times a day    Rabeprazole 20 mg - 90 tablet - take 1 tablet by mouth daily    Atenolol 50 mg tablet - 90 tablet - take tablet by mouth daily    Cyclobenzaprine - 10 mg tablet - 60 tablet - take 1 tablet by mouth 2 times a day as needed    Simvastatin 20 mg tablet -90 take 1 tablet by mouth daily     Losartan 100mg tablet - 90 tablet - take 1 tablet by mouth daily      Please print all of these she will bring them back if she does not use them        Has appt 5/15/19

## 2019-03-07 ENCOUNTER — APPOINTMENT (OUTPATIENT)
Dept: PHYSICAL THERAPY | Facility: CLINIC | Age: 59
End: 2019-03-07
Payer: MEDICARE

## 2019-03-07 RX ORDER — LEVOTHYROXINE SODIUM 0.07 MG/1
75 TABLET ORAL DAILY
Qty: 90 TABLET | Refills: 0 | Status: SHIPPED | OUTPATIENT
Start: 2019-03-07 | End: 2019-05-15 | Stop reason: SDUPTHER

## 2019-03-07 RX ORDER — RABEPRAZOLE SODIUM 20 MG/1
20 TABLET, DELAYED RELEASE ORAL DAILY
Qty: 90 TABLET | Refills: 0 | Status: SHIPPED | OUTPATIENT
Start: 2019-03-07 | End: 2019-06-03 | Stop reason: SDUPTHER

## 2019-03-07 RX ORDER — ALPRAZOLAM 1 MG/1
TABLET ORAL
Qty: 120 TABLET | Refills: 0 | Status: SHIPPED | OUTPATIENT
Start: 2019-03-07 | End: 2019-04-04 | Stop reason: SDUPTHER

## 2019-03-07 RX ORDER — BUSPIRONE HYDROCHLORIDE 5 MG/1
5 TABLET ORAL 3 TIMES DAILY
Qty: 90 TABLET | Refills: 0 | Status: SHIPPED | OUTPATIENT
Start: 2019-03-07 | End: 2019-04-01 | Stop reason: SDUPTHER

## 2019-03-07 RX ORDER — LOSARTAN POTASSIUM 100 MG/1
100 TABLET ORAL DAILY
Qty: 90 TABLET | Refills: 0 | Status: SHIPPED | OUTPATIENT
Start: 2019-03-07 | End: 2019-04-04 | Stop reason: SDUPTHER

## 2019-03-07 RX ORDER — SIMVASTATIN 20 MG
20 TABLET ORAL DAILY
Qty: 90 TABLET | Refills: 0 | Status: SHIPPED | OUTPATIENT
Start: 2019-03-07 | End: 2019-04-04 | Stop reason: SDUPTHER

## 2019-03-07 RX ORDER — CYCLOBENZAPRINE HCL 10 MG
10 TABLET ORAL 2 TIMES DAILY PRN
Qty: 60 TABLET | Refills: 0 | Status: SHIPPED | OUTPATIENT
Start: 2019-03-07

## 2019-03-07 RX ORDER — ATENOLOL 50 MG/1
50 TABLET ORAL DAILY
Qty: 90 TABLET | Refills: 0 | Status: SHIPPED | OUTPATIENT
Start: 2019-03-07 | End: 2019-05-15 | Stop reason: SDUPTHER

## 2019-03-08 NOTE — TELEPHONE ENCOUNTER
Ruth Ann Goodrich stopped in and picked up all of her printed scripts  Pt mentioned that her local  pharmacy does not have her buspirone 5 mg tablet mediation they are out pt asked if she finds a pharmacy that has it can Dr Espinal send a new script and or if they have the 10 mg they can cut it in half for pt to get the 5 mg  I informed pt that if she finds a pharmacy that has the 10 mg we can ask Laura Rangel whom is here today and in the office this weekend if she could please authorize a new script that is adjusted to what the pharmacy has  I informed we could certainly ask Dr Espinal  Pt asked [facility] can her refills be sent to another pharmacy I informed pt id ask her pharmacy I do apologize I am not sure

## 2019-03-13 ENCOUNTER — OFFICE VISIT (OUTPATIENT)
Dept: PHYSICAL THERAPY | Facility: CLINIC | Age: 59
End: 2019-03-13
Payer: MEDICARE

## 2019-03-13 DIAGNOSIS — M25.50 MULTIPLE JOINT PAIN: Primary | ICD-10-CM

## 2019-03-13 PROCEDURE — 97110 THERAPEUTIC EXERCISES: CPT

## 2019-03-13 NOTE — PROGRESS NOTES
PT Re-Evaluation     Today's date: 3/13/2019  Patient name: Savanah Meehan  : 1960  MRN: [id number]  Referring provider: Peggy Stauffer DO  Dx:   Encounter Diagnosis     ICD-10-CM    1  Multiple joint pain M25 50        Start Time: 3101  Stop Time: 1025  Total time in clinic (min): 35 minutes    Assessment  Assessment details: Savanah Meehan has been compliant with attending PT and HEP since initial eval  Светлана Hollis has made small improvements in objective data since IE but is still limited compared to normal  Светлана Hollis had a fall onto R shoulder last week which has caused an increase in not only her shoulder pain, but also general discomfort through majority of trunk/upper body  She continues with above listed impairments and would benefit from additional skilled PT to address these deficits to return to PLOF  Impairments: abnormal muscle tone, abnormal or restricted ROM, activity intolerance, impaired physical strength, pain with function, weight-bearing intolerance and poor posture   Functional limitations: difficulty completing household chores and ADLs (including dressing, self-care hygiene, cleaning and cooking), difficulty with transfers and sleeping  Symptom irritability: moderateUnderstanding of Dx/Px/POC: good   Prognosis: good    Goals  STG  1) Cervical and shoulder ROM will improve 50% in 4 weeks  -Not met  2) Lumbar and hip ROM will improve 50% in 4 weeks  -Partially met  3) B/L hip and shoulder strength will improve 1/2 grade in 4 weeks  -Not met  4) Patient will be independent in HEP in 4 weeks  -Partially met  LTG  1) B/L hip and shoulder strength will improve to 5/5 in 8 weeks  2) Lumbar and hip ROM will improve to [facility] in 8 weeks  3) Cervical and shoulder ROM will improve to [facility] in 8 weeks  4) Patient will have 50-75% improvement in pain reports with activity in 8 weeks  5) Patient will return to use of home cardio equipment for exercise at least 3x/week within 8 weeks      Plan  Patient would benefit from: skilled physical therapy  Planned therapy interventions: abdominal trunk stabilization, home exercise program, flexibility, functional ROM exercises, therapeutic exercise, therapeutic activities, stretching, strengthening, patient education, neuromuscular re-education, manual therapy, activity modification, balance/weight bearing training, behavior modification, body mechanics training, breathing training, postural training, muscle pump exercises, massage and joint mobilization  Frequency: 2x week  Duration in weeks: 8  Treatment plan discussed with: patient        Subjective Evaluation    History of Present Illness  Mechanism of injury: Patient presents after 2 weeks, after she fell onto her R shoulder while performing pball rollout stretches  She says the ball rolled to the left and her upper body fell to the right onto the floor, with shoulder breaking her fall  She says she has stopped doing any of her exercises for fear of increased pain  Her shoulder has been about 5-8/10 pain depending on if she moves it a certain way or after she takes Tylenol (which helps to relieve)    Quality of life: fair    Pain  Current pain ratin  At best pain ratin  At worst pain ratin  Quality: cramping, discomfort, dull ache, sharp and tight  Relieving factors: heat, change in position and medications  Aggravating factors: walking, standing, lifting, sitting and stair climbing (standing still (doing dishes))  Progression: no change    Social Support  Lives in: [facility]  Lives with: alone    Hand dominance: right    Treatments  Previous treatment: physical therapy and medication  Current treatment: physical therapy  Patient Goals  Patient goals for therapy: increased strength, increased motion, decreased pain and return to sport/leisure activities          Objective     Concurrent Complaints  Negative for night pain, disturbed sleep, bladder dysfunction, bowel dysfunction and saddle (S4) numbness    Postural Observations  Seated posture: fair  Standing posture: fair  Correction of posture: makes symptoms better        Palpation   Left   No palpable tenderness to the erector spinae and lumbar paraspinals  Hypertonic in the suboccipitals and upper trapezius  Tenderness of the cervical paraspinals, external abdominal oblique, quadratus lumborum and upper trapezius  Right   No palpable tenderness to the erector spinae and lumbar paraspinals  Hypertonic in the suboccipitals and upper trapezius  Tenderness of the cervical paraspinals, external abdominal oblique, quadratus lumborum and upper trapezius  Tenderness     Lumbar Spine  No tenderness in the spinous process       Neurological Testing     Additional Neurological Details  Normal UE neuro screen  Normal LE neuro screen    Active Range of Motion     Additional Active Range of Motion Details  Cervical AROM  Flexion: limited 50% with posterior cervical pain  Extension: limited 25% with centralized cervical pain  B/L SB: limited 50% with ipsilateral "pinching" and contralateral stretch  B/L rotation: limited 25% due to stretch/tightness  Lumbar AROM  Flexion: limited 10%  Extension: limited 50% with centralized LBP  B/L SB: limited 50% with ipsilateral pain  B/L rotation: WNL    Strength/Myotome Testing     Left Shoulder     Planes of Motion   Flexion: 4   Extension: 4-   Abduction: 4-   External rotation at 0°: 4-   Internal rotation at 0°: 4     Isolated Muscles   Lower trapezius: 3+   Middle trapezius: 3+   Upper trapezius: 3+     Right Shoulder     Planes of Motion   Flexion: 3+   Extension: 3+   Abduction: 3+   External rotation at 0°: 3+   Internal rotation at 0°: 4-     Isolated Muscles   Lower trapezius: 3+   Middle trapezius: 3+   Upper trapezius: 3+     Left Hip   Planes of Motion   Flexion: 4-  Extension: 4-  Abduction: 4-  Adduction: 4+    Right Hip   Planes of Motion   Flexion: 4  Extension: 4-  Abduction: 4-  Adduction: 4+    Left Knee   Flexion: 4  Extension: 4    Right Knee   Flexion: 4  Extension: 4    Left Ankle/Foot   Dorsiflexion: 4+  Plantar flexion: 4    Right Ankle/Foot   Dorsiflexion: 4+  Plantar flexion: 4    Tests     Lumbar     Left   Positive passive SLR  Right   Positive passive SLR          Precautions: DM type II, fibromyalgia, Grave's disease, HTN, rheumatoid arthritis, anxiety, depression    Daily Treatment Diary    Manuals 1/16 1/23 1/25 1/28 2/15 2/22 2/27 3/1 3/13    Gentle cervical stretching, STM EV EV EV AN AN EV EV EV AN                                           Exercise Diary              Bike warm up NP NP NP NP Try NV NP NP NP NP    LB stretch pball rollout 10"x5 fwd 10"x5 10"x5 10"x5 10" x10 10"x 10 10"x5 ea 10"x5 ea NP    Chin tucks 3"x20 3"x20 3"x20 3"x30 3"x30 3"x30 3"x30 3"x30 3"x30    Scapular retractions 3"x20 3"x20 --          Cervical S/B isometrics NV NV 5"x10 5"x10 5"x10 5"x10 5"x10 5"x10 5"x10                 LTR 5"x10 Attempted, pain HELD Held NV NV NV 5"x5 5"x5    Isometric hip abduction Belt  3" hold, 2 min Belt 3" hold, 2 min Belt  3" hold, 2 min Belt 3" hold, 2 min Belt 3" hold, 2 min Belt  3" hold, 2 min Belt 3" hold, 2 min Belt  3" hold, 2 min Belt 3" x 2 min    Isometric hip adduction 3" hold, 2 min 3" hold, 2 min 3" hold, 2 min 3" hold, 2 min 3" hold, 2 min 3" hold, 2 min 3" hold, 2 min 3" hold, 2 min 3" x 2 min    TA activation 3" hold, 2 min 3" hold, 2 min 3" hold, 2 min HEP HEP -- -- --     TA with bridge NP NP NP NP NP NP NP NP NP    TA with iso hip flexion NP NP NP NP NP NP NP NP NP                 TA w/ TB rows Pink  3" hold, 2x10 Pink 3" hold, 2x10 Pink 3" hold, 2x10 PTB 3" 2x10 PTB 3" 3x10 NV Pink  3" hold, 2x10 Pink  3" hold, 2x10 PTB 3" 2x10    TA w/ TB ext Pink 2x10 Pink 2x10 Pink 2x10 PTB 2x10 PTB 3x10 NV Pink 2x10 Pink  2x10 PTB 2x10    TB trunk rotation NP NP NP NP NP NP NP NP NP    TB Pallof press B/L NP NP NP PTB 5" x10 PTB 5" x10 NV NV Pink  5"x10 NV Modalities             MHP/CP prn deferred deferred deferred Defers Defers deferred deferred

## 2019-03-15 ENCOUNTER — APPOINTMENT (OUTPATIENT)
Dept: PHYSICAL THERAPY | Facility: CLINIC | Age: 59
End: 2019-03-15
Payer: MEDICARE

## 2019-03-16 DIAGNOSIS — E11.9 TYPE 2 DIABETES MELLITUS WITHOUT COMPLICATION, WITHOUT LONG-TERM CURRENT USE OF INSULIN (HCC): ICD-10-CM

## 2019-03-18 ENCOUNTER — OFFICE VISIT (OUTPATIENT)
Dept: PHYSICAL THERAPY | Facility: CLINIC | Age: 59
End: 2019-03-18
Payer: MEDICARE

## 2019-03-18 DIAGNOSIS — M25.50 MULTIPLE JOINT PAIN: Primary | ICD-10-CM

## 2019-03-18 PROCEDURE — 97110 THERAPEUTIC EXERCISES: CPT

## 2019-03-18 PROCEDURE — 97140 MANUAL THERAPY 1/> REGIONS: CPT

## 2019-03-18 NOTE — PROGRESS NOTES
Daily Note     Today's date: 3/18/2019  Patient name: Bruce Caicedo  : 1960  MRN: [id number]  Referring provider: Prince Stone DO  Dx:   Encounter Diagnosis     ICD-10-CM    1  Multiple joint pain M25 50      Start Time:   Stop Time:   Total time in clinic (min): 53 minutes    ADDENDUM: Patient was out of town and was to contact clinic with any questions or exercise-related concerns upon return  Shahzad Richards has not contacted clinic, and will be discharged from PT at this time  -AN, PT (2019)    Subjective: Patient tells me her neck is more sore this morning, unsure if maybe the weather is contributing  She said she felt okay over the weekend but when she woke up today "it was like a punch in the face" because her neck and both arms/hands were sore  She says her shoulder was doing a little better after last visit, and she is back to performing LB stretch rolling p-ball out with no pain  Objective: See treatment diary below  FOTO score improved from 34 to 45 in 11 visits  Assessment: Tolerated treatment fair  Patient had reported discomfort in R hand during manual R UT stretch, likely due to neural tension  Instructed patient in median and ulnar nerve glides, however no symptoms elicited here  Patient demonstrated fatigue post treatment, exhibited good technique with therapeutic exercises and would benefit from continued PT      Plan: Continue per plan of care            Precautions: DM type II, fibromyalgia, Grave's disease, HTN, rheumatoid arthritis, anxiety, depression    Daily Treatment Diary    Manuals 1/16 1/23 1/25 1/28 2/15 2/22 2/27 3/1 3/13 3/18   Gentle cervical stretching, STM EV EV EV AN AN EV EV EV AN AN                                          Exercise Diary              Bike warm up NP NP NP NP Try NV NP NP NP NP NP   LB stretch pball rollout 10"x5 fwd 10"x5 10"x5 10"x5 10" x10 10"x 10 10"x5 ea 10"x5 ea NP 10" x5 each   Chin tucks 3"x20 3"x20 3"x20 3"x30 3"x30 3"x30 3"x30 3"x30 3"x30 3"x30   Scapular retractions 3"x20 3"x20 --          Cervical S/B isometrics NV NV 5"x10 5"x10 5"x10 5"x10 5"x10 5"x10 5"x10 5"x10                LTR 5"x10 Attempted, pain HELD Held NV NV NV 5"x5 5"x5 5" x10   Isometric hip abduction Belt  3" hold, 2 min Belt 3" hold, 2 min Belt  3" hold, 2 min Belt 3" hold, 2 min Belt 3" hold, 2 min Belt  3" hold, 2 min Belt 3" hold, 2 min Belt  3" hold, 2 min Belt 3" x 2 min Belt 3" x 2 min   Isometric hip adduction 3" hold, 2 min 3" hold, 2 min 3" hold, 2 min 3" hold, 2 min 3" hold, 2 min 3" hold, 2 min 3" hold, 2 min 3" hold, 2 min 3" x 2 min 3" x 2 min   TA activation 3" hold, 2 min 3" hold, 2 min 3" hold, 2 min HEP HEP -- -- --     TA with bridge NP NP NP NP NP NP NP NP NP NP   TA with iso hip flexion NP NP NP NP NP NP NP NP NP NP                TA w/ TB rows Pink  3" hold, 2x10 Pink 3" hold, 2x10 Pink 3" hold, 2x10 PTB 3" 2x10 PTB 3" 3x10 NV Pink  3" hold, 2x10 Pink  3" hold, 2x10 PTB 3" 2x10 PTB 3" 2x10   TA w/ TB ext Pink 2x10 Pink 2x10 Pink 2x10 PTB 2x10 PTB 3x10 NV Pink 2x10 Pink  2x10 PTB 2x10 PTB 2x10   TB trunk rotation NP NP NP NP NP NP NP NP NP NP   TB Pallof press B/L NP NP NP PTB 5" x10 PTB 5" x10 NV NV Pink  5"x10 NV PTB 5"x10                                                       Modalities             MHP/CP prn deferred deferred deferred Defers Defers deferred deferred

## 2019-03-21 ENCOUNTER — APPOINTMENT (OUTPATIENT)
Dept: PHYSICAL THERAPY | Facility: CLINIC | Age: 59
End: 2019-03-21
Payer: MEDICARE

## 2019-03-25 ENCOUNTER — APPOINTMENT (OUTPATIENT)
Dept: PHYSICAL THERAPY | Facility: CLINIC | Age: 59
End: 2019-03-25
Payer: MEDICARE

## 2019-03-28 ENCOUNTER — APPOINTMENT (OUTPATIENT)
Dept: PHYSICAL THERAPY | Facility: CLINIC | Age: 59
End: 2019-03-28
Payer: MEDICARE

## 2019-03-30 DIAGNOSIS — I10 ESSENTIAL HYPERTENSION: ICD-10-CM

## 2019-03-30 DIAGNOSIS — F41.9 ANXIETY: ICD-10-CM

## 2019-03-30 DIAGNOSIS — E78.2 MIXED HYPERLIPIDEMIA: ICD-10-CM

## 2019-04-01 DIAGNOSIS — F41.9 ANXIETY: ICD-10-CM

## 2019-04-02 DIAGNOSIS — I10 ESSENTIAL HYPERTENSION: ICD-10-CM

## 2019-04-02 DIAGNOSIS — E78.2 MIXED HYPERLIPIDEMIA: ICD-10-CM

## 2019-04-02 DIAGNOSIS — F41.9 ANXIETY: ICD-10-CM

## 2019-04-04 DIAGNOSIS — M25.50 MULTIPLE JOINT PAIN: ICD-10-CM

## 2019-04-04 RX ORDER — ALPRAZOLAM 1 MG/1
TABLET ORAL
Qty: 120 TABLET | Refills: 0 | Status: SHIPPED | OUTPATIENT
Start: 2019-04-04 | End: 2019-06-03 | Stop reason: SDUPTHER

## 2019-04-04 RX ORDER — BUSPIRONE HYDROCHLORIDE 5 MG/1
5 TABLET ORAL 3 TIMES DAILY
Qty: 90 TABLET | Refills: 0 | Status: SHIPPED | OUTPATIENT
Start: 2019-04-04 | End: 2019-06-08 | Stop reason: SDUPTHER

## 2019-04-04 RX ORDER — LOSARTAN POTASSIUM 100 MG/1
TABLET ORAL
Qty: 90 TABLET | Refills: 1 | Status: SHIPPED | OUTPATIENT
Start: 2019-04-04 | End: 2019-06-08 | Stop reason: SDUPTHER

## 2019-04-04 RX ORDER — LOSARTAN POTASSIUM 100 MG/1
100 TABLET ORAL DAILY
Qty: 90 TABLET | Refills: 0 | OUTPATIENT
Start: 2019-04-04

## 2019-04-04 RX ORDER — ALPRAZOLAM 1 MG/1
TABLET ORAL
Qty: 120 TABLET | Refills: 0 | OUTPATIENT
Start: 2019-04-04

## 2019-04-04 RX ORDER — SIMVASTATIN 20 MG
20 TABLET ORAL DAILY
Qty: 90 TABLET | Refills: 0 | OUTPATIENT
Start: 2019-04-04

## 2019-04-04 RX ORDER — SIMVASTATIN 20 MG
TABLET ORAL
Qty: 90 TABLET | Refills: 1 | Status: SHIPPED | OUTPATIENT
Start: 2019-04-04 | End: 2019-06-08 | Stop reason: SDUPTHER

## 2019-05-08 ENCOUNTER — APPOINTMENT (OUTPATIENT)
Dept: LAB | Facility: CLINIC | Age: 59
End: 2019-05-08
Payer: MEDICARE

## 2019-05-08 DIAGNOSIS — E03.9 HYPOTHYROIDISM, UNSPECIFIED TYPE: ICD-10-CM

## 2019-05-08 DIAGNOSIS — I10 ESSENTIAL HYPERTENSION: ICD-10-CM

## 2019-05-08 DIAGNOSIS — E11.9 TYPE 2 DIABETES MELLITUS WITHOUT COMPLICATION, WITHOUT LONG-TERM CURRENT USE OF INSULIN (HCC): ICD-10-CM

## 2019-05-08 LAB
ALBUMIN SERPL BCP-MCNC: 4 G/DL (ref 3.5–5)
ALP SERPL-CCNC: 67 U/L (ref 46–116)
ALT SERPL W P-5'-P-CCNC: 49 U/L (ref 12–78)
ANION GAP SERPL CALCULATED.3IONS-SCNC: 8 MMOL/L (ref 4–13)
AST SERPL W P-5'-P-CCNC: 22 U/L (ref 5–45)
BILIRUB SERPL-MCNC: 0.49 MG/DL (ref 0.2–1)
BUN SERPL-MCNC: 20 MG/DL (ref 5–25)
CALCIUM SERPL-MCNC: 9.8 MG/DL (ref 8.3–10.1)
CHLORIDE SERPL-SCNC: 102 MMOL/L (ref 100–108)
CO2 SERPL-SCNC: 26 MMOL/L (ref 21–32)
CREAT SERPL-MCNC: 0.87 MG/DL (ref 0.6–1.3)
EST. AVERAGE GLUCOSE BLD GHB EST-MCNC: 223 MG/DL
GFR SERPL CREATININE-BSD FRML MDRD: 74 ML/MIN/1.73SQ M
GLUCOSE P FAST SERPL-MCNC: 237 MG/DL (ref 65–99)
HBA1C MFR BLD: 9.4 % (ref 4.2–6.3)
POTASSIUM SERPL-SCNC: 4.5 MMOL/L (ref 3.5–5.3)
PROT SERPL-MCNC: 7.8 G/DL (ref 6.4–8.2)
SODIUM SERPL-SCNC: 136 MMOL/L (ref 136–145)
T4 FREE SERPL-MCNC: 1.21 NG/DL (ref 0.76–1.46)
TSH SERPL DL<=0.05 MIU/L-ACNC: 6.54 UIU/ML (ref 0.36–3.74)

## 2019-05-08 PROCEDURE — 84443 ASSAY THYROID STIM HORMONE: CPT

## 2019-05-08 PROCEDURE — 83036 HEMOGLOBIN GLYCOSYLATED A1C: CPT

## 2019-05-08 PROCEDURE — 36415 COLL VENOUS BLD VENIPUNCTURE: CPT

## 2019-05-08 PROCEDURE — 80053 COMPREHEN METABOLIC PANEL: CPT

## 2019-05-08 PROCEDURE — 84439 ASSAY OF FREE THYROXINE: CPT

## 2019-05-15 ENCOUNTER — OFFICE VISIT (OUTPATIENT)
Dept: FAMILY MEDICINE CLINIC | Facility: CLINIC | Age: 59
End: 2019-05-15
Payer: MEDICARE

## 2019-05-15 VITALS
HEIGHT: 65 IN | BODY MASS INDEX: 36.75 KG/M2 | HEART RATE: 87 BPM | RESPIRATION RATE: 16 BRPM | SYSTOLIC BLOOD PRESSURE: 120 MMHG | OXYGEN SATURATION: 97 % | WEIGHT: 220.6 LBS | DIASTOLIC BLOOD PRESSURE: 76 MMHG | TEMPERATURE: 98.1 F

## 2019-05-15 DIAGNOSIS — E03.9 ACQUIRED HYPOTHYROIDISM: ICD-10-CM

## 2019-05-15 DIAGNOSIS — E11.9 TYPE 2 DIABETES MELLITUS WITHOUT COMPLICATION, WITHOUT LONG-TERM CURRENT USE OF INSULIN (HCC): ICD-10-CM

## 2019-05-15 DIAGNOSIS — I10 ESSENTIAL HYPERTENSION: ICD-10-CM

## 2019-05-15 DIAGNOSIS — Z00.00 MEDICARE ANNUAL WELLNESS VISIT, INITIAL: Primary | ICD-10-CM

## 2019-05-15 PROCEDURE — 99214 OFFICE O/P EST MOD 30 MIN: CPT | Performed by: FAMILY MEDICINE

## 2019-05-15 PROCEDURE — G0438 PPPS, INITIAL VISIT: HCPCS | Performed by: FAMILY MEDICINE

## 2019-05-15 RX ORDER — LEVOTHYROXINE SODIUM 88 UG/1
88 TABLET ORAL DAILY
Qty: 90 TABLET | Refills: 1 | Status: SHIPPED | OUTPATIENT
Start: 2019-05-15 | End: 2019-06-08 | Stop reason: SDUPTHER

## 2019-05-15 RX ORDER — ATENOLOL 50 MG/1
50 TABLET ORAL DAILY
Qty: 90 TABLET | Refills: 1 | Status: SHIPPED | OUTPATIENT
Start: 2019-05-15 | End: 2019-06-03 | Stop reason: SDUPTHER

## 2019-06-03 DIAGNOSIS — F41.9 ANXIETY: ICD-10-CM

## 2019-06-03 DIAGNOSIS — K21.9 GASTROESOPHAGEAL REFLUX DISEASE, ESOPHAGITIS PRESENCE NOT SPECIFIED: ICD-10-CM

## 2019-06-03 DIAGNOSIS — I10 ESSENTIAL HYPERTENSION: ICD-10-CM

## 2019-06-03 RX ORDER — RABEPRAZOLE SODIUM 20 MG/1
20 TABLET, DELAYED RELEASE ORAL DAILY
Qty: 90 TABLET | Refills: 1 | Status: SHIPPED | OUTPATIENT
Start: 2019-06-03 | End: 2019-06-06 | Stop reason: SDUPTHER

## 2019-06-03 RX ORDER — ALPRAZOLAM 1 MG/1
TABLET ORAL
Qty: 120 TABLET | Refills: 1 | Status: SHIPPED | OUTPATIENT
Start: 2019-06-03 | End: 2019-06-06 | Stop reason: SDUPTHER

## 2019-06-03 RX ORDER — ATENOLOL 50 MG/1
50 TABLET ORAL DAILY
Qty: 90 TABLET | Refills: 1 | Status: SHIPPED | OUTPATIENT
Start: 2019-06-03 | End: 2019-06-06 | Stop reason: SDUPTHER

## 2019-06-05 DIAGNOSIS — I10 ESSENTIAL HYPERTENSION: ICD-10-CM

## 2019-06-05 DIAGNOSIS — F41.9 ANXIETY: ICD-10-CM

## 2019-06-05 DIAGNOSIS — K21.9 GASTROESOPHAGEAL REFLUX DISEASE, ESOPHAGITIS PRESENCE NOT SPECIFIED: ICD-10-CM

## 2019-06-05 RX ORDER — ALPRAZOLAM 1 MG/1
TABLET ORAL
Qty: 120 TABLET | Refills: 0 | Status: CANCELLED | OUTPATIENT
Start: 2019-06-05

## 2019-06-05 RX ORDER — ATENOLOL 50 MG/1
50 TABLET ORAL DAILY
Qty: 90 TABLET | Refills: 0 | Status: CANCELLED | OUTPATIENT
Start: 2019-06-05

## 2019-06-05 RX ORDER — RABEPRAZOLE SODIUM 20 MG/1
20 TABLET, DELAYED RELEASE ORAL DAILY
Qty: 90 TABLET | Refills: 0 | Status: CANCELLED | OUTPATIENT
Start: 2019-06-05

## 2019-06-06 DIAGNOSIS — K21.9 GASTROESOPHAGEAL REFLUX DISEASE, ESOPHAGITIS PRESENCE NOT SPECIFIED: ICD-10-CM

## 2019-06-06 DIAGNOSIS — F41.9 ANXIETY: ICD-10-CM

## 2019-06-06 DIAGNOSIS — I10 ESSENTIAL HYPERTENSION: ICD-10-CM

## 2019-06-06 RX ORDER — RABEPRAZOLE SODIUM 20 MG/1
20 TABLET, DELAYED RELEASE ORAL DAILY
Qty: 90 TABLET | Refills: 1 | Status: SHIPPED | OUTPATIENT
Start: 2019-06-06

## 2019-06-06 RX ORDER — ATENOLOL 50 MG/1
50 TABLET ORAL DAILY
Qty: 90 TABLET | Refills: 1 | Status: SHIPPED | OUTPATIENT
Start: 2019-06-06 | End: 2019-06-08 | Stop reason: SDUPTHER

## 2019-06-06 RX ORDER — ALPRAZOLAM 1 MG/1
TABLET ORAL
Qty: 120 TABLET | Refills: 0 | Status: SHIPPED | OUTPATIENT
Start: 2019-06-06 | End: 2019-06-08 | Stop reason: SDUPTHER

## 2019-06-07 NOTE — TELEPHONE ENCOUNTER
[facility]  And earlier in the week I had sign 90 day written prescriptions  I left message on patient's voice mail as I am not sure if there is a problem with getting her prescriptions filled? I will try to call her tomorrow when I am back in the office

## 2019-06-08 ENCOUNTER — TELEPHONE (OUTPATIENT)
Dept: FAMILY MEDICINE CLINIC | Facility: CLINIC | Age: 59
End: 2019-06-08

## 2019-06-08 DIAGNOSIS — I10 ESSENTIAL HYPERTENSION: ICD-10-CM

## 2019-06-08 DIAGNOSIS — E11.9 TYPE 2 DIABETES MELLITUS WITHOUT COMPLICATION, WITHOUT LONG-TERM CURRENT USE OF INSULIN (HCC): ICD-10-CM

## 2019-06-08 DIAGNOSIS — F41.9 ANXIETY: ICD-10-CM

## 2019-06-08 DIAGNOSIS — K21.9 GASTROESOPHAGEAL REFLUX DISEASE, ESOPHAGITIS PRESENCE NOT SPECIFIED: ICD-10-CM

## 2019-06-08 DIAGNOSIS — E03.9 ACQUIRED HYPOTHYROIDISM: ICD-10-CM

## 2019-06-08 DIAGNOSIS — E78.2 MIXED HYPERLIPIDEMIA: ICD-10-CM

## 2019-06-08 RX ORDER — SIMVASTATIN 20 MG
20 TABLET ORAL DAILY
Qty: 90 TABLET | Refills: 1 | Status: SHIPPED | OUTPATIENT
Start: 2019-06-08

## 2019-06-08 RX ORDER — LOSARTAN POTASSIUM 100 MG/1
100 TABLET ORAL DAILY
Qty: 90 TABLET | Refills: 1 | Status: SHIPPED | OUTPATIENT
Start: 2019-06-08

## 2019-06-08 RX ORDER — ATENOLOL 50 MG/1
50 TABLET ORAL DAILY
Qty: 90 TABLET | Refills: 1 | Status: SHIPPED | OUTPATIENT
Start: 2019-06-08

## 2019-06-08 RX ORDER — BUSPIRONE HYDROCHLORIDE 5 MG/1
5 TABLET ORAL 3 TIMES DAILY
Qty: 90 TABLET | Refills: 0 | Status: SHIPPED | OUTPATIENT
Start: 2019-06-08 | End: 2019-07-29 | Stop reason: SDUPTHER

## 2019-06-08 RX ORDER — LEVOTHYROXINE SODIUM 88 UG/1
88 TABLET ORAL DAILY
Qty: 90 TABLET | Refills: 1 | Status: SHIPPED | OUTPATIENT
Start: 2019-06-08

## 2019-06-08 RX ORDER — ALPRAZOLAM 1 MG/1
TABLET ORAL
Qty: 120 TABLET | Refills: 0 | Status: SHIPPED | OUTPATIENT
Start: 2019-06-08

## 2019-07-29 DIAGNOSIS — F41.9 ANXIETY: ICD-10-CM

## 2019-07-29 RX ORDER — BUSPIRONE HYDROCHLORIDE 5 MG/1
5 TABLET ORAL 3 TIMES DAILY
Qty: 90 TABLET | Refills: 0 | Status: SHIPPED | OUTPATIENT
Start: 2019-07-29 | End: 2019-07-29 | Stop reason: SDUPTHER

## 2019-07-29 NOTE — TELEPHONE ENCOUNTER
Pt called requesting a refill to [facility] in [facility]  Pt states she has an appt scheduled with a new PCP there but it's not until 8/15/2019

## 2019-07-30 RX ORDER — BUSPIRONE HYDROCHLORIDE 5 MG/1
5 TABLET ORAL 3 TIMES DAILY
Qty: 90 TABLET | Refills: 0 | Status: SHIPPED | OUTPATIENT
Start: 2019-07-30 | End: 2019-08-02 | Stop reason: SDUPTHER

## 2019-08-02 DIAGNOSIS — F41.9 ANXIETY: ICD-10-CM

## 2019-08-05 DIAGNOSIS — F41.9 ANXIETY: ICD-10-CM

## 2019-08-05 RX ORDER — BUSPIRONE HYDROCHLORIDE 5 MG/1
5 TABLET ORAL 3 TIMES DAILY
Qty: 270 TABLET | Refills: 0 | Status: SHIPPED | OUTPATIENT
Start: 2019-08-05 | End: 2019-08-05 | Stop reason: SDUPTHER

## 2019-08-05 RX ORDER — BUSPIRONE HYDROCHLORIDE 5 MG/1
TABLET ORAL
Qty: 270 TABLET | Refills: 0 | Status: SHIPPED | OUTPATIENT
Start: 2019-08-05

## 2019-11-15 NOTE — TELEPHONE ENCOUNTER
I spoke with patient on Saturday - she needed Rx'es sent to pharmacy and she is moving so needed printed Rx'es Plastic Surgeon Procedure Text (E): After obtaining clear surgical margins the patient was sent to plastics for surgical repair.  The patient understands they will receive post-surgical care and follow-up from the referring physician's office.

## 2021-12-17 NOTE — ASSESSMENT & PLAN NOTE
Lab Results   Component Value Date    HGBA1C 9 0 (H) 02/07/2019     Patient has increased her Metformin to three tabs daily 
TSH is very mildly elevated, but T4 is ok - keep medication the same 
3 side steps along EOB

## 2023-01-03 ENCOUNTER — TELEPHONE (OUTPATIENT)
Dept: INTERNAL MEDICINE CLINIC | Facility: CLINIC | Age: 63
End: 2023-01-03